# Patient Record
Sex: FEMALE | Race: WHITE | NOT HISPANIC OR LATINO | Employment: FULL TIME | URBAN - METROPOLITAN AREA
[De-identification: names, ages, dates, MRNs, and addresses within clinical notes are randomized per-mention and may not be internally consistent; named-entity substitution may affect disease eponyms.]

---

## 2017-01-04 RX ORDER — TRAMADOL HYDROCHLORIDE AND ACETAMINOPHEN 37.5; 325 MG/1; MG/1
TABLET, FILM COATED ORAL
Qty: 120 TABLET | Refills: 0 | Status: SHIPPED | OUTPATIENT
Start: 2017-01-04 | End: 2017-01-06 | Stop reason: SDUPTHER

## 2017-01-05 RX ORDER — TRAMADOL HYDROCHLORIDE AND ACETAMINOPHEN 37.5; 325 MG/1; MG/1
TABLET, FILM COATED ORAL
Qty: 120 TABLET | Refills: 0 | OUTPATIENT
Start: 2017-01-05

## 2017-01-06 RX ORDER — TRAMADOL HYDROCHLORIDE AND ACETAMINOPHEN 37.5; 325 MG/1; MG/1
TABLET, FILM COATED ORAL
Qty: 120 TABLET | Refills: 0 | Status: SHIPPED | OUTPATIENT
Start: 2017-01-06 | End: 2017-02-08 | Stop reason: SDUPTHER

## 2017-02-08 RX ORDER — TRAMADOL HYDROCHLORIDE AND ACETAMINOPHEN 37.5; 325 MG/1; MG/1
TABLET, FILM COATED ORAL
Qty: 120 TABLET | Refills: 0 | Status: SHIPPED | OUTPATIENT
Start: 2017-02-08 | End: 2017-03-14 | Stop reason: SDUPTHER

## 2017-02-08 NOTE — TELEPHONE ENCOUNTER
----- Message from Adenike Mojica sent at 2/8/2017  3:38 PM CST -----  Contact: [phone]/ self   Patient would like refill of tramadol sent to [facility] on [address]. Please advise.

## 2017-02-09 RX ORDER — TRAMADOL HYDROCHLORIDE AND ACETAMINOPHEN 37.5; 325 MG/1; MG/1
TABLET, FILM COATED ORAL
Qty: 120 TABLET | Refills: 0 | OUTPATIENT
Start: 2017-02-09

## 2017-03-03 ENCOUNTER — TELEPHONE (OUTPATIENT)
Dept: FAMILY MEDICINE | Facility: CLINIC | Age: 42
End: 2017-03-03

## 2017-03-03 DIAGNOSIS — R05.9 COUGH: Primary | ICD-10-CM

## 2017-03-03 RX ORDER — CODEINE PHOSPHATE AND GUAIFENESIN 10; 100 MG/5ML; MG/5ML
10 SOLUTION ORAL EVERY 6 HOURS PRN
Qty: 180 ML | Refills: 0 | Status: SHIPPED | OUTPATIENT
Start: 2017-03-03 | End: 2017-03-13

## 2017-03-03 NOTE — TELEPHONE ENCOUNTER
Patient and  have a cold, congestion, cough, and runny nose both afebrile. They would like cough medicine called in. They are unable to come see a physician as they are preparing for a  tomorrow. [facility] on [address], or

## 2017-03-03 NOTE — TELEPHONE ENCOUNTER
----- Message from Carlie Nagel sent at 3/3/2017 12:39 PM CST -----  No. [phone]    Patient returned your call.

## 2017-03-03 NOTE — TELEPHONE ENCOUNTER
----- Message from Carlie Nagel sent at 3/3/2017 11:34 AM CST -----  No. [phone]   Patient has a cold, congestion, cough, and runny nose.  Can you call in a Zpac to [facility] on [address], or can she come in today for an injection.   Please call.   She has a family  to attend tomorrow.

## 2017-03-14 RX ORDER — TRAMADOL HYDROCHLORIDE AND ACETAMINOPHEN 37.5; 325 MG/1; MG/1
TABLET, FILM COATED ORAL
Qty: 120 TABLET | Refills: 0 | Status: SHIPPED | OUTPATIENT
Start: 2017-03-14 | End: 2017-04-12 | Stop reason: SDUPTHER

## 2017-04-12 RX ORDER — TRAMADOL HYDROCHLORIDE AND ACETAMINOPHEN 37.5; 325 MG/1; MG/1
TABLET, FILM COATED ORAL
Qty: 120 TABLET | Refills: 0 | Status: SHIPPED | OUTPATIENT
Start: 2017-04-12 | End: 2017-05-23 | Stop reason: SDUPTHER

## 2017-05-23 RX ORDER — TRAMADOL HYDROCHLORIDE AND ACETAMINOPHEN 37.5; 325 MG/1; MG/1
TABLET, FILM COATED ORAL
Qty: 120 TABLET | Refills: 0 | Status: SHIPPED | OUTPATIENT
Start: 2017-05-23 | End: 2017-06-22 | Stop reason: SDUPTHER

## 2017-05-25 ENCOUNTER — TELEPHONE (OUTPATIENT)
Dept: FAMILY MEDICINE | Facility: CLINIC | Age: 42
End: 2017-05-25

## 2017-06-23 RX ORDER — TRAMADOL HYDROCHLORIDE AND ACETAMINOPHEN 37.5; 325 MG/1; MG/1
TABLET, FILM COATED ORAL
Qty: 120 TABLET | Refills: 0 | Status: SHIPPED | OUTPATIENT
Start: 2017-06-23 | End: 2017-06-26

## 2017-06-26 ENCOUNTER — OFFICE VISIT (OUTPATIENT)
Dept: FAMILY MEDICINE | Facility: CLINIC | Age: 42
End: 2017-06-26
Payer: COMMERCIAL

## 2017-06-26 VITALS
DIASTOLIC BLOOD PRESSURE: 88 MMHG | HEART RATE: 91 BPM | BODY MASS INDEX: 27 KG/M2 | SYSTOLIC BLOOD PRESSURE: 126 MMHG | OXYGEN SATURATION: 99 % | HEIGHT: 66 IN | WEIGHT: 168 LBS

## 2017-06-26 DIAGNOSIS — M51.9 LUMBAR DISC DISEASE: ICD-10-CM

## 2017-06-26 DIAGNOSIS — M25.552 LEFT HIP PAIN: ICD-10-CM

## 2017-06-26 DIAGNOSIS — M54.42 CHRONIC MIDLINE LOW BACK PAIN WITH LEFT-SIDED SCIATICA: ICD-10-CM

## 2017-06-26 DIAGNOSIS — G89.29 CHRONIC MIDLINE LOW BACK PAIN WITH LEFT-SIDED SCIATICA: ICD-10-CM

## 2017-06-26 DIAGNOSIS — S69.92XA INJURY OF LEFT THUMB, INITIAL ENCOUNTER: ICD-10-CM

## 2017-06-26 DIAGNOSIS — M51.36 ANNULAR TEAR OF LUMBAR DISC: Primary | ICD-10-CM

## 2017-06-26 PROCEDURE — 99214 OFFICE O/P EST MOD 30 MIN: CPT | Mod: S$GLB,,, | Performed by: FAMILY MEDICINE

## 2017-06-26 PROCEDURE — 99999 PR PBB SHADOW E&M-EST. PATIENT-LVL III: CPT | Mod: PBBFAC,,, | Performed by: FAMILY MEDICINE

## 2017-06-26 RX ORDER — TRAMADOL HYDROCHLORIDE 50 MG/1
50 TABLET ORAL EVERY 12 HOURS PRN
Qty: 60 TABLET | Refills: 0 | Status: SHIPPED | OUTPATIENT
Start: 2017-06-26 | End: 2017-07-28 | Stop reason: SDUPTHER

## 2017-06-26 NOTE — PROGRESS NOTES
Subjective:       Patient ID: Kimber Moran is a 41 y.o. female.    Chief Complaint: Dry Skin (Left Eyelid); Hip Pain (Left hip); and Back Pain    41 yr old pleasant white female with chronic low back pain, lumbar disc disease, presents today for her 3 month follow up and medication refills. She had knife cut on her left thumb and also dry skin on the left eyelid.    Chronic LBP - onset many years ago - tried spine epidural injections and seen chiropractor and nothing helped her - she started taking ultracet and it helped - she also tried norco for severe pain - she was never offered physical therapy - she denies any surgery in the back and no car accidents - her MRI from 2013 showed mild degenerative change with no significant central canal or neural foraminal stenosis. L4-5 annular fissure. Of note, no neurological symptoms. She also requesting PT.        History as below - reviewed        Medication Refill   This is a chronic problem. The current episode started more than 1 year ago. The problem occurs constantly. The problem has been gradually improving. Associated symptoms include a rash. Pertinent negatives include no arthralgias, chest pain, congestion, diaphoresis, headaches, myalgias, nausea or sore throat. Nothing aggravates the symptoms. Treatments tried: ultracet. The treatment provided significant relief.   Back Pain   This is a chronic problem. The current episode started more than 1 year ago. The problem occurs constantly. The problem is unchanged. The pain is present in the lumbar spine. The quality of the pain is described as aching. The pain does not radiate. The pain is at a severity of 6/10. The pain is moderate. The symptoms are aggravated by bending, sitting and twisting. Pertinent negatives include no chest pain, dysuria, headaches, paresthesias, pelvic pain or tingling. She has tried analgesics for the symptoms. The treatment provided significant relief.     Review of Systems   Constitutional:  Negative.  Negative for activity change, diaphoresis and unexpected weight change.   HENT: Negative.  Negative for congestion, ear discharge, hearing loss, rhinorrhea, sore throat and voice change.    Eyes: Negative.  Negative for pain, discharge and visual disturbance.   Respiratory: Negative.  Negative for chest tightness, shortness of breath and wheezing.    Cardiovascular: Negative.  Negative for chest pain.   Gastrointestinal: Negative.  Negative for abdominal distention, anal bleeding, constipation and nausea.   Endocrine: Negative.  Negative for cold intolerance, polydipsia and polyuria.   Genitourinary: Negative.  Negative for decreased urine volume, difficulty urinating, dysuria, frequency, menstrual problem, pelvic pain and vaginal pain.   Musculoskeletal: Positive for back pain. Negative for arthralgias, gait problem and myalgias.   Skin: Positive for rash and wound. Negative for color change and pallor.   Allergic/Immunologic: Negative.  Negative for environmental allergies and immunocompromised state.   Neurological: Negative.  Negative for dizziness, tingling, tremors, seizures, speech difficulty, headaches and paresthesias.   Hematological: Negative.  Negative for adenopathy. Does not bruise/bleed easily.   Psychiatric/Behavioral: Negative.  Negative for agitation, confusion, decreased concentration, hallucinations, self-injury and suicidal ideas. The patient is not nervous/anxious.        PMH/PSH/FH/SH/MED/ALLERGY reviewed        Objective:       Vitals:    06/26/17 1341   BP: 126/88   Pulse: 91       Physical Exam   Constitutional: She is oriented to person, place, and time. She appears well-developed and well-nourished. No distress.   HENT:   Head: Normocephalic and atraumatic.   Right Ear: External ear normal.   Left Ear: External ear normal.   Nose: Nose normal.   Mouth/Throat: Oropharynx is clear and moist.   Eyes: Conjunctivae and EOM are normal. Pupils are equal, round, and reactive to light.    Neck: Normal range of motion. Neck supple.   Cardiovascular: Normal rate, regular rhythm, normal heart sounds and intact distal pulses.  Exam reveals no gallop and no friction rub.    No murmur heard.  Pulmonary/Chest: Effort normal and breath sounds normal. No respiratory distress. She has no wheezes. She has no rales. She exhibits no tenderness.   Abdominal: Soft. Bowel sounds are normal.   Musculoskeletal: Normal range of motion. She exhibits tenderness (TTP lumbar and paralumbar area. SLRT neg B/L. Gait normal.). She exhibits no edema.   Neurological: She is alert and oriented to person, place, and time. She has normal reflexes. She displays normal reflexes. No cranial nerve deficit. She exhibits normal muscle tone. Coordination normal.   Skin: Skin is warm and dry. Rash (dry skin scaly rash on the right eyelid) noted. She is not diaphoretic.   A small cut on the left thumb. No sign of infection.   Psychiatric: She has a normal mood and affect.       Assessment:       1. Annular tear of lumbar disc    2. Lumbar disc disease    3. Chronic midline low back pain with left-sided sciatica    4. Left hip pain    5. Injury of left thumb, initial encounter        Plan:       Kimber was seen today for dry skin, hip pain and back pain.    Diagnoses and all orders for this visit:    Annular tear of lumbar disc  -     tramadol (ULTRAM) 50 mg tablet; Take 1 tablet (50 mg total) by mouth every 12 (twelve) hours as needed for Pain.  -     Ambulatory Referral to Physical/Occupational Therapy    Lumbar disc disease  -     tramadol (ULTRAM) 50 mg tablet; Take 1 tablet (50 mg total) by mouth every 12 (twelve) hours as needed for Pain.  -     Ambulatory Referral to Physical/Occupational Therapy    Chronic midline low back pain with left-sided sciatica  -     tramadol (ULTRAM) 50 mg tablet; Take 1 tablet (50 mg total) by mouth every 12 (twelve) hours as needed for Pain.  -     Ambulatory Referral to Physical/Occupational  Therapy    Left hip pain  -     Ambulatory Referral to Physical/Occupational Therapy    Injury of left thumb, initial encounter      Chronic low back pain  -advised physical therapy  -home exercises  -limit use of tramadol - due to addictive potential    Injury left thumb  -bactroban and bandage    Spent adequate time in obtaining history and explaining differentials    40 minutes spent during this visit of which greater than 50% devoted to face-face counseling and coordination of care regarding diagnosis and management plan    Return in about 4 weeks (around 7/24/2017), or if symptoms worsen or fail to improve.

## 2017-07-07 ENCOUNTER — CLINICAL SUPPORT (OUTPATIENT)
Dept: REHABILITATION | Facility: HOSPITAL | Age: 42
End: 2017-07-07
Attending: FAMILY MEDICINE
Payer: COMMERCIAL

## 2017-07-07 DIAGNOSIS — M53.3 SI (SACROILIAC) JOINT DYSFUNCTION: ICD-10-CM

## 2017-07-07 DIAGNOSIS — M54.40 CHRONIC BILATERAL LOW BACK PAIN WITH SCIATICA, SCIATICA LATERALITY UNSPECIFIED: ICD-10-CM

## 2017-07-07 DIAGNOSIS — G89.29 CHRONIC BILATERAL LOW BACK PAIN WITH SCIATICA, SCIATICA LATERALITY UNSPECIFIED: ICD-10-CM

## 2017-07-07 PROBLEM — M54.50 CHRONIC BILATERAL LOW BACK PAIN: Status: ACTIVE | Noted: 2017-07-07

## 2017-07-07 PROCEDURE — 97110 THERAPEUTIC EXERCISES: CPT | Mod: PO

## 2017-07-07 PROCEDURE — 97161 PT EVAL LOW COMPLEX 20 MIN: CPT | Mod: PO

## 2017-07-07 NOTE — PROGRESS NOTES
OUTPATIENT PHYSICAL THERAPY   PATIENT EVALUATION        Name: Kimber Moran  Clinic Number: [id number]        Diagnosis:   Encounter Diagnoses   Name Primary?    SI (sacroiliac) joint dysfunction     Chronic bilateral low back pain with sciatica, sciatica laterality unspecified      Physician: Julio Cesar Perez MD  Treatment Orders: PT Eval and Treat    Past Medical History:   Diagnosis Date    Anxiety     Chronic low back pain     Depression      Current Outpatient Prescriptions   Medication Sig    tramadol (ULTRAM) 50 mg tablet Take 1 tablet (50 mg total) by mouth every 12 (twelve) hours as needed for Pain.     No current facility-administered medications for this visit.      Review of patient's allergies indicates:   Allergen Reactions    Sulfa (sulfonamide antibiotics) Hives and Itching         Precautions: standard    Evaluation Date: 7/7/17  Visit # authorized: 1  Authorization period: 6/26/18  Plan of care Expiration: 9/1/17      Subjective     Onset Date: Chronic for about 10 years  Prior Level of Function: mod I with ADLs and IADLs  Current level of Function: limited due to pain  Social History: Pt is an , and she states she works 14 hour shifts at the [employer].      History of Present Illness: Kimber is a 41 y.o. female that presents to [facility] secondary to chronic LBP. Kimber states she had previous physical therapy with dry needling, and she states that it helped with her pain.  She reports her pain is mostly in her lower back and L hip rarely into her leg, Denies numbness and tingling. Previous tx with chiropracter which pt also reports was helping. She joined a boxing club this week, and she reports she was very sore afterwards. She states her back handled it okay. She states she performs her HEP consistently.    Imaging: MRI taken and revealed Mild degenerative change with no significant central canal or neural foraminal stenosis.  L4-5 annular fissure.    Pain:  current 2/10, worst 9/10, best 2/10, Aching, intermittent  Aggravating factors: standing, household chores, sitting on the floor, long car rides, twisting, leaning back to wash the back of her body  Easing factors: pain medication (tramadol), stretching    Pts goals: to decrease pain and get back into a good exercise program      No cultural, environmental, or spiritual barriers identified to treatment or learning.    Objective     Observation: L sacral rotation present with L anterior ASIS rotation in standing and supine      Lumbar Range of Motion:    Degrees Pain   Flexion 100%   Pull in L side of back        Extension 75%   No pain        Left Side Bending 25% Pain in L side        Right Side Bending 75% Slight stretching on L side        Left rotation   75% pain        Right Rotation   100% No pain             Lower Extremity Strength  Right LE  Left LE    Knee extension: 5/5 Knee extension: 5/5   Knee flexion: 4+/5 Knee flexion: 4+/5   Hip flexion: 5/5 Hip flexion: 4+/5   Hip extension:  4-/5 Hip extension: 4-/5   Hip abduction: 4-/5 Hip abduction: 4-/5   Hip adduction: 4-/5 Hip adduction 4-/5   Ankle dorsiflexion: 5/5 Ankle dorsiflexion: 4+/5   Ankle plantarflexion: NT Ankle plantarflexion: NT     Hip ROM: normal PROM noted    DTR:   Right Left Comment   Patellar (L3-4) 2+ 2+    Achilles (S1) 2+ 2+      Dermatomes:In tact to LT in all dermatomes patterns    Neuro Dynamic Testing:    Sciatic nerve:      SLR: negative SLRs B        Joint Mobility: Hypomobile L3 and L5 with CPA pressure    Palpation: tenderness in L lumbar paraspinal and L PSIS and gluteals      Flexibility:    Ely's test: R = WNL degrees ; L = WNL degrees   Popliteal Angle: R = WNL degrees ; L = WNL degrees       PT Evaluation Completed? Yes  Discussed Plan of Care with patient: Yes    TREATMENT:  Patient provided written and verbal consent to receive functional dry needling at today's visit (see consent form scanned into chart). FDN performed  to L piriformis and L gluteus medius (2 spots) with estim. FDN performed to reduce pain and muscle tension, promote blood flow, and improve ROM and function x 8 minutes. Pt tolerated tx well without adverse effects. She was educated on what to expect following the procedure and she verbalized understanding.        HEP provided: Reviewed ab bracing, piriformis stretching, and SKTC which pt was issued at previous therapy episode and which therapist would like pt to continue.  Instructed pt. Regarding: Proper technique with all exercises. Pt demo good understanding of the education provided. Kimber demonstrated good return demonstration of activities.      Assessment     Kimber is a 41 y.o. female referred to outpatient physical therapy with a diagnosis of chronic LBP. Demonstrates impairments including: limitations as described in the problem list. Pt prognosis is Good. Positive prognostic factors include previous successful episodes of therapy, age, activity level. Negative prognostic factors include chronicity of pain. Pt will benefit from skilled outpatient physical therapy to address the above stated deficits, provide pt/family education, and to maximize pt's level of independence.     Medical necessity is demonstrated by the following IMPAIRMENTS/PROBLEMS:  weakness, impaired endurance, impaired functional mobility, decreased lower extremity function, pain, decreased ROM, impaired joint extensibility and impaired muscle length    History  Co-morbidities and personal factors that may impact the plan of care Examination  Body Structures and Functions, activity limitations and participation restrictions that may impact the plan of care Clinical Presentation   Decision Making/ Complexity Score   Co-morbidities:   anxiety        Personal Factors:   no deficits Body Regions:   back  lower extremities    Body Systems:   gross symmetry  ROM  strength  transfers  transitions  motor control  motor learning    Activity  limitations:   Learning and applying knowledge  no deficits    General Tasks and Commands  no deficits    Mobility  lifting and carrying objects  walking  prolonged positioning    Self care  no deficits    Domestic Life  shopping  cooking  doing house work (cleaning house, washing dishes, laundry)    Life Areas  no deficits    Community and Social Life  community life    Participation Restrictions:   Limited with prolonged positioning required at work         stable and uncomplicated            mod high low low         Rehab Potiential: good    Anticipated Barriers for physical therapy: none    GOALS:    Short Term Goals:  4 weeks    - Pt to increase lumbar L sidebending ROM by at least 25% degrees in order to show improved mobility.  - Pt to be independent and consistent with issued HEP in order to promote carryover between therapy sessions.  - Pt will be able to perform and hold an ab brace for 10 reps of 10 second hold with normal breathing and no cuing in order to demo improved core recruitment.    Long Term Goals: 8 weeks    - Pt will report a decrease in LBP to 2/10  to increase tolerance for prolonged positioning required for work tasks in order to improve activity tolerance.  - Pt will increase hip strength by 1 ms grade in order to increase tolerance to ADLs and functional activities.  - Pt to be Independent with updated HEP in order to maintain gains following discharge from PT.  - Pt will present with good SI alignment without therapist assist for 3 consecutive session to show improved lumbo pelvic stability.        Plan     Recommended Treatment Plan: Pt will be treated by physical therapy 1-3 times a week for 8 weeks for pt education, HEP, therapeutic exercises, neuromuscular re-education, manual therapy, modalities prn to achieve established goals.  Kimber may at times be seen by a PTA as part of the Rehab Team.     Other Recommendations: none      Therapist: Ashley Holstein, PT    I CERTIFY THE NEED  FOR THESE SERVICES FURNISHED UNDER THIS PLAN OF TREATMENT AND WHILE UNDER MY CARE    Physician's comments: ________________________________________________________________________________________________________________________________________________      Physician's Name: ___________________________________

## 2017-07-07 NOTE — PLAN OF CARE
OUTPATIENT PHYSICAL THERAPY   PATIENT EVALUATION        Name: Kimber Moran  Clinic Number: [id number]        Diagnosis:   Encounter Diagnoses   Name Primary?    SI (sacroiliac) joint dysfunction     Chronic bilateral low back pain with sciatica, sciatica laterality unspecified      Physician: Julio Cesar Perez MD  Treatment Orders: PT Eval and Treat    Past Medical History:   Diagnosis Date    Anxiety     Chronic low back pain     Depression      Current Outpatient Prescriptions   Medication Sig    tramadol (ULTRAM) 50 mg tablet Take 1 tablet (50 mg total) by mouth every 12 (twelve) hours as needed for Pain.     No current facility-administered medications for this visit.      Review of patient's allergies indicates:   Allergen Reactions    Sulfa (sulfonamide antibiotics) Hives and Itching         Precautions: standard    Evaluation Date: 7/7/17  Visit # authorized: 1  Authorization period: 6/26/18  Plan of care Expiration: 9/1/17      Subjective     Onset Date: Chronic for about 10 years  Prior Level of Function: mod I with ADLs and IADLs  Current level of Function: limited due to pain  Social History: Pt is an , and she states she works 14 hour shifts at the [employer].      History of Present Illness: Kimber is a 41 y.o. female that presents to [facility] secondary to chronic LBP. Kimber states she had previous physical therapy with dry needling, and she states that it helped with her pain.  She reports her pain is mostly in her lower back and L hip rarely into her leg, Denies numbness and tingling. Previous tx with chiropracter which pt also reports was helping. She joined a boxing club this week, and she reports she was very sore afterwards. She states her back handled it okay. She states she performs her HEP consistently.    Imaging: MRI taken and revealed Mild degenerative change with no significant central canal or neural foraminal stenosis.  L4-5 annular fissure.    Pain:  current 2/10, worst 9/10, best 2/10, Aching, intermittent  Aggravating factors: standing, household chores, sitting on the floor, long car rides, twisting, leaning back to wash the back of her body  Easing factors: pain medication (tramadol), stretching    Pts goals: to decrease pain and get back into a good exercise program      No cultural, environmental, or spiritual barriers identified to treatment or learning.    Objective     Observation: L sacral rotation present with L anterior ASIS rotation in standing and supine      Lumbar Range of Motion:    Degrees Pain   Flexion 100%   Pull in L side of back        Extension 75%   No pain        Left Side Bending 25% Pain in L side        Right Side Bending 75% Slight stretching on L side        Left rotation   75% pain        Right Rotation   100% No pain             Lower Extremity Strength  Right LE  Left LE    Knee extension: 5/5 Knee extension: 5/5   Knee flexion: 4+/5 Knee flexion: 4+/5   Hip flexion: 5/5 Hip flexion: 4+/5   Hip extension:  4-/5 Hip extension: 4-/5   Hip abduction: 4-/5 Hip abduction: 4-/5   Hip adduction: 4-/5 Hip adduction 4-/5   Ankle dorsiflexion: 5/5 Ankle dorsiflexion: 4+/5   Ankle plantarflexion: NT Ankle plantarflexion: NT     Hip ROM: normal PROM noted    DTR:   Right Left Comment   Patellar (L3-4) 2+ 2+    Achilles (S1) 2+ 2+      Dermatomes:In tact to LT in all dermatomes patterns    Neuro Dynamic Testing:    Sciatic nerve:      SLR: negative SLRs B        Joint Mobility: Hypomobile L3 and L5 with CPA pressure    Palpation: tenderness in L lumbar paraspinal and L PSIS and gluteals      Flexibility:    Ely's test: R = WNL degrees ; L = WNL degrees   Popliteal Angle: R = WNL degrees ; L = WNL degrees       PT Evaluation Completed? Yes  Discussed Plan of Care with patient: Yes    TREATMENT:  Patient provided written and verbal consent to receive functional dry needling at today's visit (see consent form scanned into chart). FDN performed  to L piriformis and L gluteus medius (2 spots) with estim. FDN performed to reduce pain and muscle tension, promote blood flow, and improve ROM and function x 8 minutes. Pt tolerated tx well without adverse effects. She was educated on what to expect following the procedure and she verbalized understanding.        HEP provided: Reviewed ab bracing, piriformis stretching, and SKTC which pt was issued at previous therapy episode and which therapist would like pt to continue.  Instructed pt. Regarding: Proper technique with all exercises. Pt demo good understanding of the education provided. Kimber demonstrated good return demonstration of activities.      Assessment     Kimber is a 41 y.o. female referred to outpatient physical therapy with a diagnosis of chronic LBP. Demonstrates impairments including: limitations as described in the problem list. Pt prognosis is Good. Positive prognostic factors include previous successful episodes of therapy, age, activity level. Negative prognostic factors include chronicity of pain. Pt will benefit from skilled outpatient physical therapy to address the above stated deficits, provide pt/family education, and to maximize pt's level of independence.     Medical necessity is demonstrated by the following IMPAIRMENTS/PROBLEMS:  weakness, impaired endurance, impaired functional mobility, decreased lower extremity function, pain, decreased ROM, impaired joint extensibility and impaired muscle length    History  Co-morbidities and personal factors that may impact the plan of care Examination  Body Structures and Functions, activity limitations and participation restrictions that may impact the plan of care Clinical Presentation   Decision Making/ Complexity Score   Co-morbidities:   anxiety        Personal Factors:   no deficits Body Regions:   back  lower extremities    Body Systems:   gross symmetry  ROM  strength  transfers  transitions  motor control  motor learning    Activity  limitations:   Learning and applying knowledge  no deficits    General Tasks and Commands  no deficits    Mobility  lifting and carrying objects  walking  prolonged positioning    Self care  no deficits    Domestic Life  shopping  cooking  doing house work (cleaning house, washing dishes, laundry)    Life Areas  no deficits    Community and Social Life  community life    Participation Restrictions:   Limited with prolonged positioning required at work         stable and uncomplicated            mod high low low         Rehab Potiential: good    Anticipated Barriers for physical therapy: none    GOALS:    Short Term Goals:  4 weeks    - Pt to increase lumbar L sidebending ROM by at least 25% degrees in order to show improved mobility.  - Pt to be independent and consistent with issued HEP in order to promote carryover between therapy sessions.  - Pt will be able to perform and hold an ab brace for 10 reps of 10 second hold with normal breathing and no cuing in order to demo improved core recruitment.    Long Term Goals: 8 weeks    - Pt will report a decrease in LBP to 2/10  to increase tolerance for prolonged positioning required for work tasks in order to improve activity tolerance.  - Pt will increase hip strength by 1 ms grade in order to increase tolerance to ADLs and functional activities.  - Pt to be Independent with updated HEP in order to maintain gains following discharge from PT.  - Pt will present with good SI alignment without therapist assist for 3 consecutive session to show improved lumbo pelvic stability.        Plan     Recommended Treatment Plan: Pt will be treated by physical therapy 1-3 times a week for 8 weeks for pt education, HEP, therapeutic exercises, neuromuscular re-education, manual therapy, modalities prn to achieve established goals.  Kimber may at times be seen by a PTA as part of the Rehab Team.     Other Recommendations: none      Therapist: Ashley Holstein, PT    I CERTIFY THE NEED  FOR THESE SERVICES FURNISHED UNDER THIS PLAN OF TREATMENT AND WHILE UNDER MY CARE    Physician's comments: ________________________________________________________________________________________________________________________________________________      Physician's Name: ___________________________________

## 2017-07-14 ENCOUNTER — CLINICAL SUPPORT (OUTPATIENT)
Dept: REHABILITATION | Facility: HOSPITAL | Age: 42
End: 2017-07-14
Attending: FAMILY MEDICINE
Payer: COMMERCIAL

## 2017-07-14 DIAGNOSIS — G89.29 CHRONIC BILATERAL LOW BACK PAIN WITH SCIATICA, SCIATICA LATERALITY UNSPECIFIED: ICD-10-CM

## 2017-07-14 DIAGNOSIS — M54.40 CHRONIC BILATERAL LOW BACK PAIN WITH SCIATICA, SCIATICA LATERALITY UNSPECIFIED: ICD-10-CM

## 2017-07-14 PROCEDURE — 97140 MANUAL THERAPY 1/> REGIONS: CPT | Mod: PO

## 2017-07-14 PROCEDURE — 97110 THERAPEUTIC EXERCISES: CPT | Mod: PO

## 2017-07-14 NOTE — PROGRESS NOTES
Physical Therapy Progress Note     Name: Kimber Moarn  Clinic Number: [id number]  Diagnosis:   Encounter Diagnosis   Name Primary?    Chronic bilateral low back pain with sciatica, sciatica laterality unspecified      Physician: Julio Cesar Perez MD  Treatment Orders: PT Eval and Treat  Past Medical History:   Diagnosis Date    Anxiety     Chronic low back pain     Depression        Precautions: standard    Evaluation Date: 7/7/17  Visit # authorized: 1  Authorization period: 6/26/18  Plan of care Expiration: 9/1/17      Time In: 07:04 am  Time Out: 0756 am  Total Treatment Time: 50 minutes      Missed visits:  no show(),  Cancellations()    Subjective     Pt reports: Pt reports she felt pain after her boxing class on Wednesday, but she has been fine on all the other days.  Pain Scale:  2/10 pain in L buttock prior to session today    Objective     Observation: L sacral rotation present with L anterior ASIS rotation in standing and supine    Kimber received therapeutic exercises to develop strength, endurance, ROM and core stabilization for 25 minutes including:     TA brace + march 20x  Clams YTB 2 x 10  TA pulldowns with DKTC OTB and green theraball 2 x 10  Pelvic tilt + mini bridge 2 x 10  Cat/cow 10 x 10s    Kimber received the following manual therapy techniques: Joint mobilizations and Soft tissue Mobilization were applied to the: L/S and L gluteals for 25 minutes.   MET to correct L anterior ilial rotation and L sacral sidebending  STM L gluteals  Sacral scrubbing  Patient provided written and verbal consent to receive functional dry needling at today's visit (see consent form scanned into chart). FDN performed to L piriformis and L gluteus medius (2 spots) and L L3/4 multifidus with estim. FDN performed to reduce pain and muscle tension, promote blood flow, and improve ROM and function.  Pt tolerated tx well without adverse effects. She was educated on what  to expect following the procedure and she verbalized understanding.      Written Home Exercises Provided: Continue with current HEP.  Pt demo good understanding of the education provided. Kimber Moran demonstrated good return demonstration of activities.     Assessment       Good tolerance to initial tx session today with mod verbal and tactile cueing for proper core recruitment. Good twitch response in L3 erector spinae with needling to the low back. Will assess response to initial tx session and adjust accordingly. Pt will continue to benefit from skilled outpatient physical therapy to address the deficits listed below in the problem list, provide pt/family education and to maximize pt's level of independence in the home and community environment.     GOALS:    Short Term Goals:  4 weeks    - Pt to increase lumbar L sidebending ROM by at least 25% degrees in order to show improved mobility.  - Pt to be independent and consistent with issued HEP in order to promote carryover between therapy sessions.  - Pt will be able to perform and hold an ab brace for 10 reps of 10 second hold with normal breathing and no cuing in order to demo improved core recruitment.    Long Term Goals: 8 weeks    - Pt will report a decrease in LBP to 2/10  to increase tolerance for prolonged positioning required for work tasks in order to improve activity tolerance.  - Pt will increase hip strength by 1 ms grade in order to increase tolerance to ADLs and functional activities.  - Pt to be Independent with updated HEP in order to maintain gains following discharge from PT.  - Pt will present with good SI alignment without therapist assist for 3 consecutive session to show improved lumbo pelvic stability.         Plan   Continue with established Plan of Care towards PT goals.

## 2017-07-21 ENCOUNTER — CLINICAL SUPPORT (OUTPATIENT)
Dept: REHABILITATION | Facility: HOSPITAL | Age: 42
End: 2017-07-21
Attending: FAMILY MEDICINE
Payer: COMMERCIAL

## 2017-07-21 DIAGNOSIS — M54.40 CHRONIC BILATERAL LOW BACK PAIN WITH SCIATICA, SCIATICA LATERALITY UNSPECIFIED: ICD-10-CM

## 2017-07-21 DIAGNOSIS — G89.29 CHRONIC BILATERAL LOW BACK PAIN WITH SCIATICA, SCIATICA LATERALITY UNSPECIFIED: ICD-10-CM

## 2017-07-21 PROCEDURE — 97140 MANUAL THERAPY 1/> REGIONS: CPT | Mod: PO

## 2017-07-21 PROCEDURE — 97110 THERAPEUTIC EXERCISES: CPT | Mod: PO

## 2017-07-21 NOTE — PROGRESS NOTES
Physical Therapy Progress Note     Name: iKmber Moran  Clinic Number: [id number]  Diagnosis:   Encounter Diagnosis   Name Primary?    Chronic bilateral low back pain with sciatica, sciatica laterality unspecified      Physician: Julio Cesar Perez MD  Treatment Orders: PT Eval and Treat  Past Medical History:   Diagnosis Date    Anxiety     Chronic low back pain     Depression        Precautions: standard    Evaluation Date: 7/7/17  Visit # authorized: 3    Authorization period: 6/26/18  Plan of care Expiration: 9/1/17      Time In: 07:00 am  Time Out: 0755 am  Total Treatment Time: 55 minutes      Missed visits:  no show(),  Cancellations()    Subjective     Pt reports: Pt reports she has been feeling better overall.  Pain Scale:  1/10 pain in L buttock prior to session today    Objective     Observation: Pelvis in alignment     Kimber received therapeutic exercises to develop strength, endurance, ROM and core stabilization for 30 minutes including:     TA brace + march 20x  Clams YTB 2 x 10 + reverse clamshells   TA pulldowns with DKTC OTB and green theraball 2 x 10  Pelvic tilt + mini bridge 2 x 10 with OTB   Cat/cow 10 x 10s  Swiss ball alt arm/leg x 10 B  1/2 kneel cable cross 7# x 10 B       Kimber received the following manual therapy techniques: Joint mobilizations and Soft tissue Mobilization were applied to the: L/S and L gluteals for 25 minutes.   MF cupping to B lumbar paraspinals bilat (R more tender than R)  STM L gluteals  Sacral scrubbing    Patient provided written and verbal consent to receive functional dry needling at today's visit (see consent form scanned into chart). FDN performed to L piriformis and L gluteus medius (2 spots) and L L3/4 multifidus with estim. FDN performed to reduce pain and muscle tension, promote blood flow, and improve ROM and function.  Pt tolerated tx well without adverse effects. She was educated on what to expect  following the procedure and she verbalized understanding. (NOT TODAY)      Written Home Exercises Provided: Continue with current HEP.  Pt demo good understanding of the education provided. Kimber Moran demonstrated good return demonstration of activities.     Assessment       Tolerated advancements in activities above without adverse effects. Slight difficulty with elevating her R LE during quadruped activity due to weakness in R sided musculatures. Pt in proper alignment prior to therapy today and did not require therapist adjustment. Responded well to cupping in her paraspinals with improved tissue mobility noted following tx. Pt will continue to benefit from skilled outpatient physical therapy to address the deficits listed below in the problem list, provide pt/family education and to maximize pt's level of independence in the home and community environment.     GOALS:    Short Term Goals:  4 weeks    - Pt to increase lumbar L sidebending ROM by at least 25% degrees in order to show improved mobility.  - Pt to be independent and consistent with issued HEP in order to promote carryover between therapy sessions.  - Pt will be able to perform and hold an ab brace for 10 reps of 10 second hold with normal breathing and no cuing in order to demo improved core recruitment.    Long Term Goals: 8 weeks    - Pt will report a decrease in LBP to 2/10  to increase tolerance for prolonged positioning required for work tasks in order to improve activity tolerance.  - Pt will increase hip strength by 1 ms grade in order to increase tolerance to ADLs and functional activities.  - Pt to be Independent with updated HEP in order to maintain gains following discharge from PT.  - Pt will present with good SI alignment without therapist assist for 3 consecutive session to show improved lumbo pelvic stability.         Plan   Continue with established Plan of Care towards PT goals.

## 2017-07-28 DIAGNOSIS — M51.36 ANNULAR TEAR OF LUMBAR DISC: ICD-10-CM

## 2017-07-28 DIAGNOSIS — G89.29 CHRONIC MIDLINE LOW BACK PAIN WITH LEFT-SIDED SCIATICA: ICD-10-CM

## 2017-07-28 DIAGNOSIS — M54.42 CHRONIC MIDLINE LOW BACK PAIN WITH LEFT-SIDED SCIATICA: ICD-10-CM

## 2017-07-28 DIAGNOSIS — M51.9 LUMBAR DISC DISEASE: ICD-10-CM

## 2017-07-28 RX ORDER — TRAMADOL HYDROCHLORIDE 50 MG/1
TABLET ORAL
Qty: 60 TABLET | Refills: 0 | Status: SHIPPED | OUTPATIENT
Start: 2017-07-28 | End: 2017-08-30 | Stop reason: SDUPTHER

## 2017-08-11 ENCOUNTER — CLINICAL SUPPORT (OUTPATIENT)
Dept: REHABILITATION | Facility: HOSPITAL | Age: 42
End: 2017-08-11
Attending: FAMILY MEDICINE
Payer: COMMERCIAL

## 2017-08-11 DIAGNOSIS — G89.29 CHRONIC BILATERAL LOW BACK PAIN WITH SCIATICA, SCIATICA LATERALITY UNSPECIFIED: ICD-10-CM

## 2017-08-11 DIAGNOSIS — M54.40 CHRONIC BILATERAL LOW BACK PAIN WITH SCIATICA, SCIATICA LATERALITY UNSPECIFIED: ICD-10-CM

## 2017-08-11 PROCEDURE — 97110 THERAPEUTIC EXERCISES: CPT | Mod: PO

## 2017-08-11 PROCEDURE — 97140 MANUAL THERAPY 1/> REGIONS: CPT | Mod: PO

## 2017-08-11 NOTE — PROGRESS NOTES
Physical Therapy Progress Note     Name: Kimber Moran  Clinic Number: [id number]  Diagnosis:   Encounter Diagnosis   Name Primary?    Chronic bilateral low back pain with sciatica, sciatica laterality unspecified      Physician: Julio Cesar Perez MD  Treatment Orders: PT Eval and Treat  Past Medical History:   Diagnosis Date    Anxiety     Chronic low back pain     Depression        Precautions: standard    Evaluation Date: 7/7/17  Visit # authorized: 4    Authorization period: 6/26/18  Plan of care Expiration: 9/1/17      Time In: 07:05 am  Time Out: 08:00 am  Total Treatment Time: 55 minutes      Missed visits:  no show(),  Cancellations()    Subjective     Pt reports: Pt reports she had increased soreness following last session. She had a rough day on Wednesday following increased activity and time on her feet.  Pain Scale:  1/10 pain in L buttock prior to session today    Objective     Observation:     Kimber received therapeutic exercises to develop strength, endurance, ROM and core stabilization for 30 minutes including:     TA brace + advanced march 20x  Clams YTB 2 x 10 + reverse clamshells   TA pulldowns with DKTC OTB and green theraball 2 x 10  Pelvic tilt + mini bridge 2 x 10 with OTB   Cat/cow 10 x 10s  Swiss ball alt arm/leg x 10 B  1/2 kneel cable cross 7# x 10 B - out of time      Kimber received the following manual therapy techniques: Joint mobilizations and Soft tissue Mobilization were applied to the: L/S and L gluteals for 25 minutes.   MF cupping to B lumbar paraspinals bilat (R more tender than R)  STM L gluteals  Sacral scrubbing    Patient provided written and verbal consent to receive functional dry needling at today's visit (see consent form scanned into chart). FDN performed to L piriformis and L gluteus medius (2 spots) and L 5 multifidus with estim. FDN performed to reduce pain and muscle tension, promote blood flow, and improve ROM and  function.  Pt tolerated tx well without adverse effects. She was educated on what to expect following the procedure and she verbalized understanding.       Written Home Exercises Provided: Continue with current HEP.  Pt demo good understanding of the education provided. Kimber Moran demonstrated good return demonstration of activities.     Assessment       Good tolerance to tx session today with ability to perform all therex without increased pain or discomfort. Improved pelvic alignment noted following manual correction and techniques. Pt responding well to dry needling to the lumbar spine and piriformis mm; therefore tx continued.. Pt will continue to benefit from skilled outpatient physical therapy to address the deficits listed below in the problem list, provide pt/family education and to maximize pt's level of independence in the home and community environment.     GOALS:    Short Term Goals:  4 weeks    - Pt to increase lumbar L sidebending ROM by at least 25% degrees in order to show improved mobility.  - Pt to be independent and consistent with issued HEP in order to promote carryover between therapy sessions.  - Pt will be able to perform and hold an ab brace for 10 reps of 10 second hold with normal breathing and no cuing in order to demo improved core recruitment.    Long Term Goals: 8 weeks    - Pt will report a decrease in LBP to 2/10  to increase tolerance for prolonged positioning required for work tasks in order to improve activity tolerance.  - Pt will increase hip strength by 1 ms grade in order to increase tolerance to ADLs and functional activities.  - Pt to be Independent with updated HEP in order to maintain gains following discharge from PT.  - Pt will present with good SI alignment without therapist assist for 3 consecutive session to show improved lumbo pelvic stability.         Plan   Continue with established Plan of Care towards PT goals.

## 2017-08-18 ENCOUNTER — CLINICAL SUPPORT (OUTPATIENT)
Dept: REHABILITATION | Facility: HOSPITAL | Age: 42
End: 2017-08-18
Attending: FAMILY MEDICINE
Payer: COMMERCIAL

## 2017-08-18 DIAGNOSIS — G89.29 CHRONIC BILATERAL LOW BACK PAIN WITH SCIATICA, SCIATICA LATERALITY UNSPECIFIED: ICD-10-CM

## 2017-08-18 DIAGNOSIS — M54.40 CHRONIC BILATERAL LOW BACK PAIN WITH SCIATICA, SCIATICA LATERALITY UNSPECIFIED: ICD-10-CM

## 2017-08-18 PROCEDURE — 97140 MANUAL THERAPY 1/> REGIONS: CPT | Mod: PO

## 2017-08-18 PROCEDURE — 97110 THERAPEUTIC EXERCISES: CPT | Mod: PO

## 2017-08-18 NOTE — PROGRESS NOTES
Physical Therapy Progress Note     Name: Kimber Moran  Clinic Number: [id number]  Diagnosis:   Encounter Diagnosis   Name Primary?    Chronic bilateral low back pain with sciatica, sciatica laterality unspecified      Physician: Julio Cesar Perez MD  Treatment Orders: PT Eval and Treat  Past Medical History:   Diagnosis Date    Anxiety     Chronic low back pain     Depression        Precautions: standard    Evaluation Date: 7/7/17  Visit # authorized: 5    Authorization period: 6/26/18  Plan of care Expiration: 9/1/17      Time In: 07:00 am  Time Out: 08:00 am  Total Treatment Time: 60 minutes      Missed visits:  no show(),  Cancellations()    Subjective     Pt reports: Pt reports she is having good days and bad days. Wednesday was a bad day after participating in boxing.  Pain Scale:  0/10 pain in L buttock prior to session today    Objective     Observation:     Kimber received therapeutic exercises to develop strength, endurance, ROM and core stabilization for 35 minutes including:     TA brace + advanced march 20x  Clams OTB 2 x 10 + reverse clamshells   TA pulldowns with bridging OTB and green theraball 2 x 10  Pelvic tilt + mini bridge 2 x 10 with OTB   Cat/cow 10 x 10s  Swiss ball alt arm/leg x 10 B  Side plank 3 x 15s  1/2 kneel cable cross 7# x 10 B   Palloff press cable machine 2 x 10  Swiss ball rollout x 10      Kimber received the following manual therapy techniques: Joint mobilizations and Soft tissue Mobilization were applied to the: L/S and L gluteals for 25 minutes.   MF cupping to B lumbar paraspinals bilat (R more tender than R)  STM L gluteals  Sacral scrubbing  Correction for L ASIS anterior rotation    Patient provided written and verbal consent to receive functional dry needling at today's visit (see consent form scanned into chart). FDN performed to L piriformis and L gluteus medius (2 spots) and L 5 multifidus with estim. FDN performed to  reduce pain and muscle tension, promote blood flow, and improve ROM and function.  Pt tolerated tx well without adverse effects. She was educated on what to expect following the procedure and she verbalized understanding. NOT TODAY      Written Home Exercises Provided: Continue with current HEP.  Pt demo good understanding of the education provided. Kimber Moran demonstrated good return demonstration of activities.     Assessment       Tolerated advancements in activities above without adverse effects. Improved SI alignment following manual therapy at this time. Requires cueing for proper core stabilization with activities. Pt will continue to benefit from skilled outpatient physical therapy to address the deficits listed below in the problem list, provide pt/family education and to maximize pt's level of independence in the home and community environment.     GOALS:    Short Term Goals:  4 weeks    - Pt to increase lumbar L sidebending ROM by at least 25% degrees in order to show improved mobility.  - Pt to be independent and consistent with issued HEP in order to promote carryover between therapy sessions.  - Pt will be able to perform and hold an ab brace for 10 reps of 10 second hold with normal breathing and no cuing in order to demo improved core recruitment.    Long Term Goals: 8 weeks    - Pt will report a decrease in LBP to 2/10  to increase tolerance for prolonged positioning required for work tasks in order to improve activity tolerance.  - Pt will increase hip strength by 1 ms grade in order to increase tolerance to ADLs and functional activities.  - Pt to be Independent with updated HEP in order to maintain gains following discharge from PT.  - Pt will present with good SI alignment without therapist assist for 3 consecutive session to show improved lumbo pelvic stability.         Plan   Continue with established Plan of Care towards PT goals.

## 2017-08-30 DIAGNOSIS — M54.42 CHRONIC MIDLINE LOW BACK PAIN WITH LEFT-SIDED SCIATICA: ICD-10-CM

## 2017-08-30 DIAGNOSIS — M51.9 LUMBAR DISC DISEASE: ICD-10-CM

## 2017-08-30 DIAGNOSIS — G89.29 CHRONIC MIDLINE LOW BACK PAIN WITH LEFT-SIDED SCIATICA: ICD-10-CM

## 2017-08-30 DIAGNOSIS — M51.36 ANNULAR TEAR OF LUMBAR DISC: ICD-10-CM

## 2017-08-30 RX ORDER — TRAMADOL HYDROCHLORIDE 50 MG/1
TABLET ORAL
Qty: 60 TABLET | Refills: 0 | Status: SHIPPED | OUTPATIENT
Start: 2017-08-30 | End: 2017-09-27 | Stop reason: SDUPTHER

## 2017-09-01 ENCOUNTER — CLINICAL SUPPORT (OUTPATIENT)
Dept: REHABILITATION | Facility: HOSPITAL | Age: 42
End: 2017-09-01
Attending: FAMILY MEDICINE
Payer: COMMERCIAL

## 2017-09-01 DIAGNOSIS — M54.40 CHRONIC BILATERAL LOW BACK PAIN WITH SCIATICA, SCIATICA LATERALITY UNSPECIFIED: ICD-10-CM

## 2017-09-01 DIAGNOSIS — G89.29 CHRONIC BILATERAL LOW BACK PAIN WITH SCIATICA, SCIATICA LATERALITY UNSPECIFIED: ICD-10-CM

## 2017-09-01 PROCEDURE — 97140 MANUAL THERAPY 1/> REGIONS: CPT | Mod: PO

## 2017-09-01 PROCEDURE — 97110 THERAPEUTIC EXERCISES: CPT | Mod: PO

## 2017-09-01 NOTE — PROGRESS NOTES
"                                                    Physical Therapy Progress Note     Name: Kimber Moran  Clinic Number: [id number]  Diagnosis:   Encounter Diagnosis   Name Primary?    Chronic bilateral low back pain with sciatica, sciatica laterality unspecified      Physician: Julio Cesar Perez MD  Treatment Orders: PT Eval and Treat  Past Medical History:   Diagnosis Date    Anxiety     Chronic low back pain     Depression        Precautions: standard    Evaluation Date: 7/7/17  Visit # authorized: 6 (PN next visit)  Authorization period: 6/26/18  Plan of care Expiration: 9/1/17       Time In: 07:00 am  Time Out: 08:00 am  Total Treatment Time: 60 minutes      Missed visits:  no show(),  Cancellations()    Subjective     Pt reports: Pt reports she has not been feeling so great. Increased pain and "shocking" pain especially when wearing heels at work. She states she has been boxing lately as well without too much increase in pain just a general hurting overall.  Pain Scale:  3/10 pain in L buttock prior to session today    Objective     Observation:     Kimber received therapeutic exercises to develop strength, endurance, ROM and core stabilization for 45 minutes including:     TA brace + advanced march 20x  Clams OTB 2 x 10 + reverse clamshells   TA pulldowns with bridging OTB and green theraball 2 x 10  Pelvic tilt + mini bridge 2 x 10 with OTB   Cat/cow 10 x 10s  Swiss ball alt arm/leg x 10 B  Side plank 3 x 15s  1/2 kneel cable cross 7# x 10 B   Palloff press cable machine 2 x 10  Swiss ball rollout x 10      Kimber received the following manual therapy techniques: Joint mobilizations and Soft tissue Mobilization were applied to the: L/S and L gluteals for 15 minutes.   MF cupping to B lumbar paraspinals bilat (R more tender than R)  STM L gluteals  Sacral scrubbing  Correction for L ASIS anterior rotation  Patient provided written and verbal consent to receive functional dry needling at today's visit (see " consent form scanned into chart). FDN performed to L5 multifidus B and  L piriformis (2 spots) and L 5 multifidus with estim. FDN performed to reduce pain and muscle tension, promote blood flow, and improve ROM and function.  Pt tolerated tx well without adverse effects. She was educated on what to expect following the procedure and she verbalized understanding.       Written Home Exercises Provided: Continue with current HEP.  Pt demo good understanding of the education provided. Kimber Moran demonstrated good return demonstration of activities.     Assessment       Pt remains challenged with activities advanced at last session; therefore, no advancements made today. Improved SI alignment noted following manual correction. Significant twitch response noted in L piriformis muscle with FDN today. Pt encouraged to remain consistent with her HEP in order to promote good core stabilization and carryover between sessions since she only attends therapy 1x per week.   Pt will continue to benefit from skilled outpatient physical therapy to address the deficits listed below in the problem list, provide pt/family education and to maximize pt's level of independence in the home and community environment.     GOALS:    Short Term Goals:  4 weeks    - Pt to increase lumbar L sidebending ROM by at least 25% degrees in order to show improved mobility.  - Pt to be independent and consistent with issued HEP in order to promote carryover between therapy sessions.  - Pt will be able to perform and hold an ab brace for 10 reps of 10 second hold with normal breathing and no cuing in order to demo improved core recruitment.    Long Term Goals: 8 weeks    - Pt will report a decrease in LBP to 2/10  to increase tolerance for prolonged positioning required for work tasks in order to improve activity tolerance.  - Pt will increase hip strength by 1 ms grade in order to increase tolerance to ADLs and functional activities.  - Pt to be  Independent with updated HEP in order to maintain gains following discharge from PT.  - Pt will present with good SI alignment without therapist assist for 3 consecutive session to show improved lumbo pelvic stability.         Plan   Continue with established Plan of Care towards PT goals.

## 2017-09-08 ENCOUNTER — CLINICAL SUPPORT (OUTPATIENT)
Dept: REHABILITATION | Facility: HOSPITAL | Age: 42
End: 2017-09-08
Attending: FAMILY MEDICINE
Payer: COMMERCIAL

## 2017-09-08 DIAGNOSIS — M54.40 CHRONIC BILATERAL LOW BACK PAIN WITH SCIATICA, SCIATICA LATERALITY UNSPECIFIED: ICD-10-CM

## 2017-09-08 DIAGNOSIS — G89.29 CHRONIC BILATERAL LOW BACK PAIN WITH SCIATICA, SCIATICA LATERALITY UNSPECIFIED: ICD-10-CM

## 2017-09-08 PROCEDURE — 97110 THERAPEUTIC EXERCISES: CPT | Mod: PO

## 2017-09-08 PROCEDURE — 97140 MANUAL THERAPY 1/> REGIONS: CPT | Mod: PO

## 2017-09-08 NOTE — PLAN OF CARE
Physical Therapy Progress Note     Name: Kimber Moran  Clinic Number: [id number]  Diagnosis:   Encounter Diagnosis   Name Primary?    Chronic bilateral low back pain with sciatica, sciatica laterality unspecified      Physician: Julio Cesar Perez MD  Treatment Orders: PT Eval and Treat  Past Medical History:   Diagnosis Date    Anxiety     Chronic low back pain     Depression        Precautions: standard    Evaluation Date: 7/7/17  Visit # authorized: 7 / 20  Authorization period: 6/26/18  Plan of care Expiration: 11/3/17      Time In: 07:00 am  Time Out: 08:00 am  Total Treatment Time: 60 minutes      Missed visits:  no show(),  Cancellations()    Subjective     Pt reports: Pt reports she had great week in terms of low back pain. She was able to box without pain.  Pain Scale:  2/10 pain in L buttock prior to session today    Objective     Observation:   Lumbar Range of Motion:    Degrees Pain   Flexion 100%   Pull in L side of back        Extension 75%   tightness        Left Side Bending 50% Pain in L side        Right Side Bending 100% No pain      Left rotation   100% No pain        Right Rotation   100% No pain               Kimber received therapeutic exercises to develop strength, endurance, ROM and core stabilization for 45 minutes including:     TA brace + advanced march 20x  Clams OTB 2 x 10 + reverse clamshells   TA pulldowns with bridging OTB and green theraball 2 x 10  Pelvic tilt with single leg kick out 2 x 10 with OTB   Cat/cow 10 x 10s  Swiss ball alt arm/leg x 10 B  Side plank 3 x 15s  1/2 kneel cable cross 7# x 10 B   Palloff press cable machine 2 x 10  Swiss ball rollout x 10      Kimber received the following manual therapy techniques: Joint mobilizations and Soft tissue Mobilization were applied to the: L/S and L gluteals for 15 minutes.   MF cupping to B lumbar paraspinals bilat (R more tender than R)  STM L gluteals  Sacral  scrubbing  Correction for L ASIS anterior rotation  Patient provided written and verbal consent to receive functional dry needling at today's visit (see consent form scanned into chart). FDN performed to L5 multifidus B and  L piriformis (2 spots) and L 5 multifidus with estim. FDN performed to reduce pain and muscle tension, promote blood flow, and improve ROM and function.  Pt tolerated tx well without adverse effects. She was educated on what to expect following the procedure and she verbalized understanding.       Written Home Exercises Provided: Continue with current HEP.  Pt demo good understanding of the education provided. Kimber Moran demonstrated good return demonstration of activities.     Assessment       Pt making good progress towards therapy goals at this time meeting all STGs. She is reporting overall improvements in functional mobility and decreased pain. She is participating in boxing at this time without signs or symptoms. She reports occasional pinching in her lower back with activities requiring lumbar extension.  Pt will continue to benefit from skilled outpatient physical therapy to address lumbopelvic stability including the deficits listed below in the problem list, provide pt/family education and to maximize pt's level of independence in the home and community environment.     GOALS:    Short Term Goals:  4 weeks    - Pt to increase lumbar L sidebending ROM by at least 25% degrees in order to show improved mobility. Met 9/8/17  - Pt to be independent and consistent with issued HEP in order to promote carryover between therapy sessions. Met 9/8/17  - Pt will be able to perform and hold an ab brace for 10 reps of 10 second hold with normal breathing and no cuing in order to demo improved core recruitment. Met 9/8/17    Long Term Goals: 8 weeks    - Pt will report a decrease in LBP to 2/10  to increase tolerance for prolonged positioning required for work tasks in order to improve activity  tolerance.  - Pt will increase hip strength by 1 ms grade in order to increase tolerance to ADLs and functional activities.  - Pt to be Independent with updated HEP in order to maintain gains following discharge from PT.  - Pt will present with good SI alignment without therapist assist for 3 consecutive session to show improved lumbo pelvic stability.         Plan   Continue with established Plan of Care towards PT goals for an addition 8 weeks 1x per week.

## 2017-09-08 NOTE — PROGRESS NOTES
Physical Therapy Progress Note     Name: Kimber Moran  Clinic Number: [id number]  Diagnosis:   Encounter Diagnosis   Name Primary?    Chronic bilateral low back pain with sciatica, sciatica laterality unspecified      Physician: Julio Cesar Perez MD  Treatment Orders: PT Eval and Treat  Past Medical History:   Diagnosis Date    Anxiety     Chronic low back pain     Depression        Precautions: standard    Evaluation Date: 7/7/17  Visit # authorized: 7 / 20  Authorization period: 6/26/18  Plan of care Expiration: 11/3/17      Time In: 07:00 am  Time Out: 08:00 am  Total Treatment Time: 60 minutes      Missed visits:  no show(),  Cancellations()    Subjective     Pt reports: Pt reports she had great week in terms of low back pain. She was able to box without pain.  Pain Scale:  2/10 pain in L buttock prior to session today    Objective     Observation:   Lumbar Range of Motion:    Degrees Pain   Flexion 100%   Pull in L side of back        Extension 75%   tightness        Left Side Bending 50% Pain in L side        Right Side Bending 100% No pain      Left rotation   100% No pain        Right Rotation   100% No pain               Kimber received therapeutic exercises to develop strength, endurance, ROM and core stabilization for 45 minutes including:     TA brace + advanced march 20x  Clams OTB 2 x 10 + reverse clamshells   TA pulldowns with bridging OTB and green theraball 2 x 10  Pelvic tilt with single leg kick out 2 x 10 with OTB   Cat/cow 10 x 10s  Swiss ball alt arm/leg x 10 B  Side plank 3 x 15s  1/2 kneel cable cross 7# x 10 B   Palloff press cable machine 2 x 10  Swiss ball rollout x 10      Kimber received the following manual therapy techniques: Joint mobilizations and Soft tissue Mobilization were applied to the: L/S and L gluteals for 15 minutes.   MF cupping to B lumbar paraspinals bilat (R more tender than R)  STM L gluteals  Sacral  scrubbing  Correction for L ASIS anterior rotation  Patient provided written and verbal consent to receive functional dry needling at today's visit (see consent form scanned into chart). FDN performed to L5 multifidus B and  L piriformis (2 spots) and L 5 multifidus with estim. FDN performed to reduce pain and muscle tension, promote blood flow, and improve ROM and function.  Pt tolerated tx well without adverse effects. She was educated on what to expect following the procedure and she verbalized understanding.       Written Home Exercises Provided: Continue with current HEP.  Pt demo good understanding of the education provided. Kimber Moran demonstrated good return demonstration of activities.     Assessment       Pt making good progress towards therapy goals at this time meeting all STGs. She is reporting overall improvements in functional mobility and decreased pain. She is participating in boxing at this time without signs or symptoms. She reports occasional pinching in her lower back with activities requiring lumbar extension.  Pt will continue to benefit from skilled outpatient physical therapy to address lumbopelvic stability including the deficits listed below in the problem list, provide pt/family education and to maximize pt's level of independence in the home and community environment.     GOALS:    Short Term Goals:  4 weeks    - Pt to increase lumbar L sidebending ROM by at least 25% degrees in order to show improved mobility. Met 9/8/17  - Pt to be independent and consistent with issued HEP in order to promote carryover between therapy sessions. Met 9/8/17  - Pt will be able to perform and hold an ab brace for 10 reps of 10 second hold with normal breathing and no cuing in order to demo improved core recruitment. Met 9/8/17    Long Term Goals: 8 weeks    - Pt will report a decrease in LBP to 2/10  to increase tolerance for prolonged positioning required for work tasks in order to improve activity  tolerance.  - Pt will increase hip strength by 1 ms grade in order to increase tolerance to ADLs and functional activities.  - Pt to be Independent with updated HEP in order to maintain gains following discharge from PT.  - Pt will present with good SI alignment without therapist assist for 3 consecutive session to show improved lumbo pelvic stability.         Plan   Continue with established Plan of Care towards PT goals for an addition 8 weeks 1x per week.

## 2017-09-26 ENCOUNTER — OFFICE VISIT (OUTPATIENT)
Dept: URGENT CARE | Facility: CLINIC | Age: 42
End: 2017-09-26
Payer: COMMERCIAL

## 2017-09-26 VITALS
HEIGHT: 66 IN | SYSTOLIC BLOOD PRESSURE: 120 MMHG | OXYGEN SATURATION: 99 % | RESPIRATION RATE: 16 BRPM | DIASTOLIC BLOOD PRESSURE: 82 MMHG | BODY MASS INDEX: 26.52 KG/M2 | TEMPERATURE: 98 F | WEIGHT: 165 LBS | HEART RATE: 80 BPM

## 2017-09-26 DIAGNOSIS — R30.0 DYSURIA: Primary | ICD-10-CM

## 2017-09-26 DIAGNOSIS — N39.0 URINARY TRACT INFECTION WITH HEMATURIA, SITE UNSPECIFIED: ICD-10-CM

## 2017-09-26 DIAGNOSIS — R31.9 URINARY TRACT INFECTION WITH HEMATURIA, SITE UNSPECIFIED: ICD-10-CM

## 2017-09-26 LAB
B-HCG UR QL: NEGATIVE
BILIRUB UR QL STRIP: NEGATIVE
CTP QC/QA: YES
GLUCOSE UR QL STRIP: NEGATIVE
KETONES UR QL STRIP: NEGATIVE
LEUKOCYTE ESTERASE UR QL STRIP: POSITIVE
PH, POC UA: 6.5 (ref 5–8)
POC BLOOD, URINE: POSITIVE
POC NITRATES, URINE: POSITIVE
PROT UR QL STRIP: POSITIVE
SP GR UR STRIP: 1.01 (ref 1–1.03)
UROBILINOGEN UR STRIP-ACNC: NORMAL (ref 0.1–1.1)

## 2017-09-26 PROCEDURE — 81003 URINALYSIS AUTO W/O SCOPE: CPT | Mod: QW,S$GLB,, | Performed by: FAMILY MEDICINE

## 2017-09-26 PROCEDURE — 3008F BODY MASS INDEX DOCD: CPT | Mod: S$GLB,,, | Performed by: FAMILY MEDICINE

## 2017-09-26 PROCEDURE — 81025 URINE PREGNANCY TEST: CPT | Mod: QW,S$GLB,, | Performed by: FAMILY MEDICINE

## 2017-09-26 PROCEDURE — 99214 OFFICE O/P EST MOD 30 MIN: CPT | Mod: 25,S$GLB,, | Performed by: FAMILY MEDICINE

## 2017-09-26 RX ORDER — NITROFURANTOIN 25; 75 MG/1; MG/1
100 CAPSULE ORAL 2 TIMES DAILY
Qty: 14 CAPSULE | Refills: 0 | Status: SHIPPED | OUTPATIENT
Start: 2017-09-26 | End: 2017-10-03

## 2017-09-26 RX ORDER — PHENAZOPYRIDINE HYDROCHLORIDE 200 MG/1
200 TABLET, FILM COATED ORAL 3 TIMES DAILY PRN
Qty: 6 TABLET | Refills: 0 | Status: SHIPPED | OUTPATIENT
Start: 2017-09-26 | End: 2017-09-28

## 2017-09-26 NOTE — PROGRESS NOTES
"Subjective:       Patient ID: Kimber Moran is a 41 y.o. female.    Vitals:  height is 5' 6" (1.676 m) and weight is 74.8 kg (165 lb). Her temperature is 98.4 °F (36.9 °C). Her blood pressure is 120/82 and her pulse is 80. Her respiration is 16 and oxygen saturation is 99%.     Chief Complaint: Dysuria (BLADDER PRESSURE) and Urinary Urgency    Dysuria    This is a new problem. The current episode started yesterday. The problem occurs every urination. The problem has been unchanged. Quality: PRESSURE. The pain is at a severity of 5/10. The pain is mild. There has been no fever. She is sexually active. Associated symptoms include frequency and urgency. Pertinent negatives include no chills, hematuria, nausea or vomiting. She has tried nothing for the symptoms.     Review of Systems   Constitution: Negative for chills and fever.   Musculoskeletal: Negative for back pain.   Gastrointestinal: Negative for abdominal pain, nausea and vomiting.   Genitourinary: Positive for dysuria, frequency and urgency. Negative for genital sores, hematuria, missed menses and non-menstrual bleeding.       Objective:      Physical Exam   Constitutional: She is oriented to person, place, and time. She appears well-developed and well-nourished.   HENT:   Head: Normocephalic and atraumatic.   Right Ear: External ear normal.   Left Ear: External ear normal.   Nose: Nose normal. No nasal deformity. No epistaxis.   Mouth/Throat: Oropharynx is clear and moist and mucous membranes are normal.   Eyes: Conjunctivae and lids are normal.   Neck: Trachea normal, normal range of motion and phonation normal. Neck supple.   Cardiovascular: Normal rate, regular rhythm, normal heart sounds and normal pulses.    Pulmonary/Chest: Effort normal and breath sounds normal.   Abdominal: Soft. Normal appearance and bowel sounds are normal. She exhibits no distension and no mass. There is tenderness in the suprapubic area. There is no rigidity, no guarding and no CVA " tenderness.   Neurological: She is alert and oriented to person, place, and time.   Skin: Skin is warm, dry and intact.   Psychiatric: She has a normal mood and affect. Her speech is normal and behavior is normal. Cognition and memory are normal.   Nursing note and vitals reviewed.      Assessment:       1. Dysuria    2. Urinary tract infection with hematuria, site unspecified        Plan:         Dysuria  -     POCT Urinalysis, Dipstick, Automated, W/O Scope  -     POCT urine pregnancy    Urinary tract infection with hematuria, site unspecified  -     nitrofurantoin, macrocrystal-monohydrate, (MACROBID) 100 MG capsule; Take 1 capsule (100 mg total) by mouth 2 (two) times daily.  Dispense: 14 capsule; Refill: 0  -     phenazopyridine (PYRIDIUM) 200 MG tablet; Take 1 tablet (200 mg total) by mouth 3 (three) times daily as needed for Pain.  Dispense: 6 tablet; Refill: 0      Kimber was seen today for dysuria and urinary urgency.    Diagnoses and all orders for this visit:    Dysuria  -     POCT Urinalysis, Dipstick, Automated, W/O Scope  -     POCT urine pregnancy    Urinary tract infection with hematuria, site unspecified  -     nitrofurantoin, macrocrystal-monohydrate, (MACROBID) 100 MG capsule; Take 1 capsule (100 mg total) by mouth 2 (two) times daily.  -     phenazopyridine (PYRIDIUM) 200 MG tablet; Take 1 tablet (200 mg total) by mouth 3 (three) times daily as needed for Pain.            Follow Up Comments   Make sure that you follow up with your primary care doctor in the next 2-5 days if needed .  Return to the Urgent Care if signs or symptoms change and certainly if you have worsening symptoms go to the nearest emergency department for further evaluation.     Erma Limon MD

## 2017-09-26 NOTE — PATIENT INSTRUCTIONS
"  Bladder Infection, Female (Adult)    Urine is normally doesn't have any bacteria in it. But bacteria can get into the urinary tract from the skin around the rectum. Or they can travel in the blood from elsewhere in the body. Once they are in your urinary tract, they can cause infection in the urethra (urethritis), the bladder (cystitis), or the kidneys (pyelonephritis).  The most common place for an infection is in the bladder. This is called a bladder infection. This is one of the most common infections in women. Most bladder infections are easily treated. They are not serious unless the infection spreads to the kidney.  The phrases "bladder infection," "UTI," and "cystitis" are often used to describe the same thing. But they are not always the same. Cystitis is an inflammation of the bladder. The most common cause of cystitis is an infection.  Symptoms  The infection causes inflammation in the urethra and bladder. This causes many of the symptoms. The most common symptoms of a bladder infection are:  · Pain or burning when urinating  · Having to urinate more often than usual  · Urgent need to urinate  · Only a small amount of urine comes out  · Blood in urine  · Abdominal discomfort. This is usually in the lower abdomen above the pubic bone.  · Cloudy urine  · Strong- or bad-smelling urine  · Unable to urinate (urinary retention)  · Unable to hold urine in (urinary incontinence)  · Fever  · Loss of appetite  · Confusion (in older adults)  Causes  Bladder infections are not contagious. You can't get one from someone else, from a toilet seat, or from sharing a bath.  The most common cause of bladder infections is bacteria from the bowels. The bacteria get onto the skin around the opening of the urethra. From there, they can get into the urine and travel up to the bladder, causing inflammation and infection. This usually happens because of:  · Wiping improperly after urinating. Always wipe from front to " back.  · Bowel incontinence  · Pregnancy  · Procedures such as having a catheter inserted  · Older age  · Not emptying your bladder. This can allow bacteria a chance to grow in your urine.  · Dehydration  · Constipation  · Sex  · Use of a diaphragm for birth control   Treatment  Bladder infections are diagnosed by a urine test. They are treated with antibiotics and usually clear up quickly without complications. Treatment helps prevent a more serious kidney infection.  Medicines  Medicines can help in the treatment of a bladder infection:  · Take antibiotics until they are used up, even if you feel better. It is important to finish them to make sure the infection has cleared.  · You can use acetaminophen or ibuprofen for pain, fever, or discomfort, unless another medicine was prescribed. If you have chronic liver or kidney disease, talk with your healthcare provider before using these medicines. Also talk with your provider if you've ever had a stomach ulcer or gastrointestinal bleeding, or are taking blood-thinner medicines.  · If you are given phenazopydridine to reduce burning with urination, it will cause your urine to become a bright orange color. This can stain clothing.  Care and prevention  These self-care steps can help prevent future infections:  · Drink plenty of fluids to prevent dehydration and flush out your bladder. Do this unless you must restrict fluids for other health reasons, or your doctor told you not to.  · Proper cleaning after going to the bathroom is important. Wipe from front to back after using the toilet to prevent the spread of bacteria.  · Urinate more often. Don't try to hold urine in for a long time.  · Wear loose-fitting clothes and cotton underwear. Avoid tight-fitting pants.  · Improve your diet and prevent constipation. Eat more fresh fruit and vegetables, and fiber, and less junk and fatty foods.  · Avoid sex until your symptoms are gone.  · Avoid caffeine, alcohol, and spicy  foods. These can irritate your bladder.  · Urinate right after intercourse to flush out your bladder.  · If you use birth control pills and have frequent bladder infections, discuss it with your doctor.  Follow-up care  Call your healthcare provider if all symptoms are not gone after 3 days of treatment. This is especially important if you have repeat infections.  If a culture was done, you will be told if your treatment needs to be changed. If directed, you can call to find out the results.  If X-rays were done, you will be told if the results will affect your treatment.  Call 911  Call 911 if any of the following occur:  · Trouble breathing  · Hard to wake up or confusion  · Fainting or loss of consciousness  · Rapid heart rate  When to seek medical advice  Call your healthcare provider right away if any of these occur:  · Fever of 100.4ºF (38.0ºC) or higher, or as directed by your healthcare provider  · Symptoms are not better by the third day of treatment  · Back or belly (abdominal) pain that gets worse  · Repeated vomiting, or unable to keep medicine down  · Weakness or dizziness  · Vaginal discharge  · Pain, redness, or swelling in the outer vaginal area (labia)  Date Last Reviewed: 10/1/2016  © 3032-5423 [employer]. [address] [place] [address], [place] [address]. All rights reserved. This information is not intended as a substitute for professional medical care. Always follow your healthcare professional's instructions.      Kimber was seen today for dysuria and urinary urgency.    Diagnoses and all orders for this visit:    Dysuria  -     POCT Urinalysis, Dipstick, Automated, W/O Scope  -     POCT urine pregnancy    Urinary tract infection with hematuria, site unspecified  -     nitrofurantoin, macrocrystal-monohydrate, (MACROBID) 100 MG capsule; Take 1 capsule (100 mg total) by mouth 2 (two) times daily.  -     phenazopyridine (PYRIDIUM) 200 MG tablet; Take 1 tablet (200 mg total) by mouth 3  (three) times daily as needed for Pain.            Follow Up Comments   Make sure that you follow up with your primary care doctor in the next 2-5 days if needed .  Return to the Urgent Care if signs or symptoms change and certainly if you have worsening symptoms go to the nearest emergency department for further evaluation.     Erma Limon MD

## 2017-09-27 DIAGNOSIS — G89.29 CHRONIC MIDLINE LOW BACK PAIN WITH LEFT-SIDED SCIATICA: ICD-10-CM

## 2017-09-27 DIAGNOSIS — M51.36 ANNULAR TEAR OF LUMBAR DISC: ICD-10-CM

## 2017-09-27 DIAGNOSIS — M54.42 CHRONIC MIDLINE LOW BACK PAIN WITH LEFT-SIDED SCIATICA: ICD-10-CM

## 2017-09-27 DIAGNOSIS — M51.9 LUMBAR DISC DISEASE: ICD-10-CM

## 2017-09-28 RX ORDER — TRAMADOL HYDROCHLORIDE 50 MG/1
TABLET ORAL
Qty: 60 TABLET | Refills: 0 | Status: SHIPPED | OUTPATIENT
Start: 2017-09-28 | End: 2017-11-27 | Stop reason: SDUPTHER

## 2017-09-29 ENCOUNTER — TELEPHONE (OUTPATIENT)
Dept: URGENT CARE | Facility: CLINIC | Age: 42
End: 2017-09-29

## 2017-11-24 ENCOUNTER — DOCUMENTATION ONLY (OUTPATIENT)
Dept: REHABILITATION | Facility: HOSPITAL | Age: 42
End: 2017-11-24

## 2017-11-24 PROBLEM — G89.29 CHRONIC BILATERAL LOW BACK PAIN: Status: RESOLVED | Noted: 2017-07-07 | Resolved: 2017-11-24

## 2017-11-24 PROBLEM — M54.50 CHRONIC BILATERAL LOW BACK PAIN: Status: RESOLVED | Noted: 2017-07-07 | Resolved: 2017-11-24

## 2017-11-24 NOTE — PROGRESS NOTES
PHYSICAL THERAPY DISCHARGE SUMMARY     Name: Kimber Moran  Clinic Number: [id number]    Diagnosis:        Encounter Diagnosis   Name Primary?    Chronic bilateral low back pain with sciatica, sciatica laterality unspecified        Physician: Julio Cesar Perez MD  Treatment Orders: PT Eval and Treat    Past Medical History:   Diagnosis Date    Anxiety     Chronic low back pain     Depression        Initial visit: 7/7/17  Date of Last visit: 9/8/17  Total Visits Received: 7    ASSESSMENT     Pt attended 7 visits of physical therapy . She was inconsistent with therapy attendance, and she has not been seen since last visit. She will be discharged from therapy at this time.    Short term goals status at last reassessment: met    Long term Goals Not achieved and why: did not complete full therapy episode    Discharge reason : Non-Compliance with attendance and Pt has not re-scheduled further follow-up sessions    Short Term Goals:  4 weeks     - Pt to increase lumbar L sidebending ROM by at least 25% degrees in order to show improved mobility. Met 9/8/17  - Pt to be independent and consistent with issued HEP in order to promote carryover between therapy sessions. Met 9/8/17  - Pt will be able to perform and hold an ab brace for 10 reps of 10 second hold with normal breathing and no cuing in order to demo improved core recruitment. Met 9/8/17     Long Term Goals: 8 weeks     - Pt will report a decrease in LBP to 2/10  to increase tolerance for prolonged positioning required for work tasks in order to improve activity tolerance.  - Pt will increase hip strength by 1 ms grade in order to increase tolerance to ADLs and functional activities.  - Pt to be Independent with updated HEP in order to maintain gains following discharge from PT.  - Pt will present with good SI alignment without therapist assist for 3 consecutive session to show improved lumbo pelvic stability.         PLAN   This patient is discharged from Physical  Therapy Services.

## 2017-11-27 DIAGNOSIS — M51.9 LUMBAR DISC DISEASE: ICD-10-CM

## 2017-11-27 DIAGNOSIS — M54.42 CHRONIC MIDLINE LOW BACK PAIN WITH LEFT-SIDED SCIATICA: ICD-10-CM

## 2017-11-27 DIAGNOSIS — M51.36 ANNULAR TEAR OF LUMBAR DISC: ICD-10-CM

## 2017-11-27 DIAGNOSIS — G89.29 CHRONIC MIDLINE LOW BACK PAIN WITH LEFT-SIDED SCIATICA: ICD-10-CM

## 2017-11-27 RX ORDER — TRAMADOL HYDROCHLORIDE 50 MG/1
TABLET ORAL
Qty: 60 TABLET | Refills: 0 | Status: SHIPPED | OUTPATIENT
Start: 2017-11-27 | End: 2017-12-29 | Stop reason: SDUPTHER

## 2017-11-29 DIAGNOSIS — G89.29 CHRONIC MIDLINE LOW BACK PAIN WITH LEFT-SIDED SCIATICA: ICD-10-CM

## 2017-11-29 DIAGNOSIS — M51.36 ANNULAR TEAR OF LUMBAR DISC: ICD-10-CM

## 2017-11-29 DIAGNOSIS — M51.9 LUMBAR DISC DISEASE: ICD-10-CM

## 2017-11-29 DIAGNOSIS — M54.42 CHRONIC MIDLINE LOW BACK PAIN WITH LEFT-SIDED SCIATICA: ICD-10-CM

## 2017-11-29 RX ORDER — TRAMADOL HYDROCHLORIDE 50 MG/1
TABLET ORAL
Qty: 60 TABLET | Refills: 0 | OUTPATIENT
Start: 2017-11-29

## 2017-12-14 ENCOUNTER — OFFICE VISIT (OUTPATIENT)
Dept: URGENT CARE | Facility: CLINIC | Age: 42
End: 2017-12-14
Payer: COMMERCIAL

## 2017-12-14 VITALS
HEIGHT: 66 IN | DIASTOLIC BLOOD PRESSURE: 85 MMHG | RESPIRATION RATE: 18 BRPM | BODY MASS INDEX: 26.52 KG/M2 | WEIGHT: 165 LBS | SYSTOLIC BLOOD PRESSURE: 125 MMHG | HEART RATE: 93 BPM | OXYGEN SATURATION: 97 % | TEMPERATURE: 99 F

## 2017-12-14 DIAGNOSIS — S93.432A SPRAIN OF TIBIOFIBULAR LIGAMENT OF LEFT ANKLE, INITIAL ENCOUNTER: Primary | ICD-10-CM

## 2017-12-14 DIAGNOSIS — J06.9 URTI (ACUTE UPPER RESPIRATORY INFECTION): ICD-10-CM

## 2017-12-14 PROCEDURE — 96372 THER/PROPH/DIAG INJ SC/IM: CPT | Mod: S$GLB,,, | Performed by: FAMILY MEDICINE

## 2017-12-14 PROCEDURE — 99214 OFFICE O/P EST MOD 30 MIN: CPT | Mod: 25,S$GLB,, | Performed by: FAMILY MEDICINE

## 2017-12-14 RX ORDER — BETAMETHASONE SODIUM PHOSPHATE AND BETAMETHASONE ACETATE 3; 3 MG/ML; MG/ML
12 INJECTION, SUSPENSION INTRA-ARTICULAR; INTRALESIONAL; INTRAMUSCULAR; SOFT TISSUE
Status: COMPLETED | OUTPATIENT
Start: 2017-12-14 | End: 2017-12-14

## 2017-12-14 RX ADMIN — BETAMETHASONE SODIUM PHOSPHATE AND BETAMETHASONE ACETATE 12 MG: 3; 3 INJECTION, SUSPENSION INTRA-ARTICULAR; INTRALESIONAL; INTRAMUSCULAR; SOFT TISSUE at 10:12

## 2017-12-14 NOTE — LETTER
December 14, 2017      [facility]  [address] [place] [address]  Phone: [phone]  Fax: [phone]       Patient: Kimber Moran   YOB: 1975  Date of Visit: 12/14/2017    To Whom It May Concern:    Richard Moran  was at [facility] on 12/14/2017. She may return to work/school on Dec. 18, 2017 with no restrictions. If you have any questions or concerns, or if I can be of further assistance, please do not hesitate to contact me.    Sincerely,    Caro Rose MD

## 2017-12-14 NOTE — PROGRESS NOTES
"Subjective:       Patient ID: Kimber Moran is a 41 y.o. female.    Vitals:  height is 5' 6" (1.676 m) and weight is 74.8 kg (165 lb). Her oral temperature is 98.8 °F (37.1 °C). Her blood pressure is 125/85 and her pulse is 93. Her respiration is 18 and oxygen saturation is 97%.     Chief Complaint: Trauma (fall pain to outside of lt ankle); Sinus Problem (x 2 weeks); and Cough (x 2 weeks)    Trauma   The incident occurred 6 to 12 hours ago. The incident occurred in the street. The injury mechanism was a fall. The injury occurred in the context of other. No protective equipment was used. There is an injury to the left ankle (ankle). The pain is moderate. It is unlikely that a foreign body is present. Associated symptoms include coughing. Pertinent negatives include no abdominal pain, chest pain, neck pain, numbness or weakness. There have been no prior injuries to these areas. Her tetanus status is UTD.   Sinus Problem   This is a new problem. The current episode started 1 to 4 weeks ago. There has been no fever. She is experiencing no pain. Associated symptoms include congestion, coughing, sinus pressure and sneezing. Pertinent negatives include no neck pain or shortness of breath. Past treatments include oral decongestants and acetaminophen. The treatment provided mild relief.   Cough   Associated symptoms include nasal congestion and postnasal drip. Pertinent negatives include no chest pain or shortness of breath. Nothing aggravates the symptoms. The treatment provided mild relief. Her past medical history is significant for bronchitis and environmental allergies. There is no history of asthma, COPD, emphysema or pneumonia.     Review of Systems   Constitution: Negative for weakness and malaise/fatigue.   HENT: Positive for congestion, postnasal drip, sinus pressure and sneezing. Negative for nosebleeds.    Cardiovascular: Negative for chest pain and syncope.   Respiratory: Positive for cough. Negative for " shortness of breath.    Musculoskeletal: Positive for joint pain, joint swelling and stiffness. Negative for back pain and neck pain.   Gastrointestinal: Negative for abdominal pain.   Genitourinary: Negative for hematuria.   Neurological: Negative for dizziness and numbness.   Allergic/Immunologic: Positive for environmental allergies.       Objective:      Physical Exam   Constitutional: She is oriented to person, place, and time. Vital signs are normal. She appears well-developed and well-nourished. She is active and cooperative. No distress.   HENT:   Head: Normocephalic and atraumatic.   Nose: Nose normal.   Mouth/Throat: Oropharynx is clear and moist and mucous membranes are normal.   Eyes: Conjunctivae and lids are normal.   Neck: Trachea normal, normal range of motion, full passive range of motion without pain and phonation normal. Neck supple.   Cardiovascular: Normal rate, regular rhythm, normal heart sounds, intact distal pulses and normal pulses.    Pulmonary/Chest: Effort normal and breath sounds normal.   Abdominal: Soft. Normal appearance and bowel sounds are normal. She exhibits no abdominal bruit, no pulsatile midline mass and no mass.   Musculoskeletal: She exhibits no edema or deformity.   Left foot slight limping, swelling and TTP over the left lateral malleolus, No erythema.   Neurological: She is alert and oriented to person, place, and time. She has normal strength and normal reflexes. No sensory deficit.   Skin: Skin is warm, dry and intact. She is not diaphoretic.   Psychiatric: She has a normal mood and affect. Her speech is normal and behavior is normal. Judgment and thought content normal. Cognition and memory are normal.   Nursing note and vitals reviewed.      Assessment:       1. Sprain of tibiofibular ligament of left ankle, initial encounter    2. URTI (acute upper respiratory infection)        Plan:         Sprain of tibiofibular ligament of left ankle, initial encounter  -      X-Ray Ankle Complete Left; Future; Expected date: 12/14/2017    URTI (acute upper respiratory infection)    Other orders  -     betamethasone acetate-betamethasone sodium phosphate injection 12 mg; Inject 2 mLs (12 mg total) into the muscle one time.        Elevate every 2 hours for 10 mins.  Ice 10 mins 3-5 x/ day x 2-3 days.  May ace wrap or not.  Tylenol/ Ibuprofen.  FF up with Orthopedist if not any better or if pain worsens beyond 5-7 days.  Patient/Guardian voiced understanding and agreement.    Rest. Fluids.Tylenol or Ibuprofen.  FF up with PCP/Specialist if not better as discussed.  Patient/ Guardian voiced agreement and understanding.

## 2017-12-16 ENCOUNTER — TELEPHONE (OUTPATIENT)
Dept: URGENT CARE | Facility: CLINIC | Age: 42
End: 2017-12-16

## 2017-12-17 ENCOUNTER — OFFICE VISIT (OUTPATIENT)
Dept: URGENT CARE | Facility: CLINIC | Age: 42
End: 2017-12-17
Payer: COMMERCIAL

## 2017-12-17 VITALS
SYSTOLIC BLOOD PRESSURE: 134 MMHG | BODY MASS INDEX: 26.52 KG/M2 | DIASTOLIC BLOOD PRESSURE: 88 MMHG | RESPIRATION RATE: 18 BRPM | HEART RATE: 84 BPM | OXYGEN SATURATION: 96 % | TEMPERATURE: 99 F | HEIGHT: 66 IN | WEIGHT: 165 LBS

## 2017-12-17 DIAGNOSIS — Z20.828 EXPOSURE TO THE FLU: ICD-10-CM

## 2017-12-17 DIAGNOSIS — J20.9 ACUTE BRONCHITIS WITH BRONCHOSPASM: Primary | ICD-10-CM

## 2017-12-17 LAB
CTP QC/QA: YES
FLUAV AG NPH QL: NEGATIVE
FLUBV AG NPH QL: NEGATIVE

## 2017-12-17 PROCEDURE — 99214 OFFICE O/P EST MOD 30 MIN: CPT | Mod: 25,S$GLB,, | Performed by: NURSE PRACTITIONER

## 2017-12-17 PROCEDURE — 87804 INFLUENZA ASSAY W/OPTIC: CPT | Mod: 59,QW,S$GLB, | Performed by: NURSE PRACTITIONER

## 2017-12-17 RX ORDER — ALBUTEROL SULFATE 90 UG/1
2 AEROSOL, METERED RESPIRATORY (INHALATION) EVERY 4 HOURS PRN
Qty: 1 INHALER | Refills: 0 | Status: SHIPPED | OUTPATIENT
Start: 2017-12-17 | End: 2018-01-30 | Stop reason: SDUPTHER

## 2017-12-17 RX ORDER — BENZONATATE 200 MG/1
200 CAPSULE ORAL 3 TIMES DAILY PRN
Qty: 30 CAPSULE | Refills: 0 | Status: SHIPPED | OUTPATIENT
Start: 2017-12-17 | End: 2017-12-27

## 2017-12-17 RX ORDER — OSELTAMIVIR PHOSPHATE 75 MG/1
75 CAPSULE ORAL 2 TIMES DAILY
Qty: 20 CAPSULE | Refills: 0 | Status: SHIPPED | OUTPATIENT
Start: 2017-12-17 | End: 2017-12-27

## 2017-12-17 RX ORDER — PROMETHAZINE HYDROCHLORIDE AND CODEINE PHOSPHATE 6.25; 1 MG/5ML; MG/5ML
5 SOLUTION ORAL EVERY 4 HOURS PRN
Qty: 120 ML | Refills: 0 | Status: SHIPPED | OUTPATIENT
Start: 2017-12-17 | End: 2017-12-27

## 2017-12-17 NOTE — PROGRESS NOTES
"Subjective:       Patient ID: Kimber Moran is a 41 y.o. female.    Vitals:  height is 5' 6" (1.676 m) and weight is 74.8 kg (165 lb). Her tympanic temperature is 98.6 °F (37 °C). Her blood pressure is 134/88 and her pulse is 84. Her respiration is 18 and oxygen saturation is 96%.     Chief Complaint: Cough    C/o continued non productive cough with intermittent night time wheezing, congestion, pain to her chest when she coughs, and some nasal congestion.  She recently received a steroid shot for this.  Requesting cough medication.  Her grand daughter tested positive for the flu today.      Cough   This is a new problem. The current episode started 1 to 4 weeks ago. The problem has been gradually worsening. The cough is productive of sputum. Associated symptoms include chest pain, nasal congestion, postnasal drip, shortness of breath and wheezing. Pertinent negatives include no chills, ear congestion, ear pain, eye redness, fever, headaches, heartburn, hemoptysis, myalgias, rash, rhinorrhea, sore throat, sweats or weight loss. The symptoms are aggravated by lying down. Treatments tried: Mucinex, Dayquil, nigh quil, and robitussin. The treatment provided no relief. There is no history of asthma.     Review of Systems   Constitution: Negative for chills, fever, malaise/fatigue and weight loss.   HENT: Positive for postnasal drip. Negative for congestion, ear pain, hoarse voice, rhinorrhea and sore throat.    Eyes: Negative for discharge and redness.   Cardiovascular: Positive for chest pain. Negative for dyspnea on exertion and leg swelling.        Patient states her chest is burning from coughing.   Respiratory: Positive for cough, shortness of breath, sputum production and wheezing. Negative for hemoptysis.    Skin: Negative for rash.   Musculoskeletal: Negative for myalgias.   Gastrointestinal: Positive for diarrhea (this has resolved). Negative for abdominal pain, heartburn, nausea and vomiting.   Neurological: " Negative for headaches.       Objective:      Physical Exam   Constitutional: She is oriented to person, place, and time. She appears well-developed and well-nourished. She is cooperative.  Non-toxic appearance. She does not appear ill. No distress.   HENT:   Head: Normocephalic and atraumatic.   Right Ear: Hearing, tympanic membrane, external ear and ear canal normal.   Left Ear: Hearing, tympanic membrane, external ear and ear canal normal.   Nose: Mucosal edema present. No rhinorrhea or nasal deformity. No epistaxis. Right sinus exhibits no maxillary sinus tenderness and no frontal sinus tenderness. Left sinus exhibits no maxillary sinus tenderness and no frontal sinus tenderness.   Mouth/Throat: Uvula is midline, oropharynx is clear and moist and mucous membranes are normal. No trismus in the jaw. Normal dentition. No uvula swelling. No oropharyngeal exudate, posterior oropharyngeal edema or posterior oropharyngeal erythema. Tonsils are 0 on the right. Tonsils are 0 on the left.   Eyes: Conjunctivae and lids are normal. No scleral icterus.   Sclera clear bilat   Neck: Trachea normal, full passive range of motion without pain and phonation normal. Neck supple.   Cardiovascular: Normal rate, regular rhythm, normal heart sounds and normal pulses.    Pulmonary/Chest: Effort normal and breath sounds normal. No respiratory distress. She has no wheezes.   Non productive cough present through out exam.   Abdominal: Soft. Normal appearance and bowel sounds are normal. She exhibits no distension. There is no tenderness.   Musculoskeletal: Normal range of motion. She exhibits no edema or deformity.   Lymphadenopathy:     She has no cervical adenopathy.   Neurological: She is alert and oriented to person, place, and time. She exhibits normal muscle tone. Coordination normal.   Skin: Skin is warm, dry and intact. She is not diaphoretic. No pallor.   Psychiatric: She has a normal mood and affect. Her speech is normal and  "behavior is normal. Judgment and thought content normal. Cognition and memory are normal.   Nursing note and vitals reviewed.      Assessment:       1. Acute bronchitis with bronchospasm    2. Exposure to the flu        Plan:         Acute bronchitis with bronchospasm  -     benzonatate (TESSALON) 200 MG capsule; Take 1 capsule (200 mg total) by mouth 3 (three) times daily as needed for Cough.  Dispense: 30 capsule; Refill: 0  -     promethazine-codeine 6.25-10 mg/5 ml (PHENERGAN WITH CODEINE) 6.25-10 mg/5 mL syrup; Take 5 mLs by mouth every 4 (four) hours as needed for Cough.  Dispense: 120 mL; Refill: 0  -     albuterol 90 mcg/actuation inhaler; Inhale 2 puffs into the lungs every 4 (four) hours as needed for Wheezing. Rescue  Dispense: 1 Inhaler; Refill: 0    Exposure to the flu  -     POCT Influenza A/B  -     oseltamivir (TAMIFLU) 75 MG capsule; Take 1 capsule (75 mg total) by mouth 2 (two) times daily.  Dispense: 20 capsule; Refill: 0      Patient Instructions                  If your condition worsens or fails to improve we recommend that you receive another evaluation at the ER immediately or contact your PCP to discuss your concerns or return here. You must understand that you've received an urgent care treatment only and that you may be released before all your medical problems are known or treated. You the patient will arrange for follouwp care as instructed.   If we discussed that I think your illness is viral, it will not respond to antibiotics and will last 10-14 days.  Flonase (fluticasone) is a nasal spray which is available over the counter and may help with your symptoms.   Zyrtec D, Claritin D or Allegra D can also help with symptoms of congestion and drainage.   If you have hypertension avoid using the "D" which is the decongestant   If you just have drainage you can take plain zyrtec, claritin or allegra   If you just have a congested feeling you can take pseudoephedrine (unless you have high " blood pressure) which you have to sign for behind the counter. Do not buy the phenylephrine which is on the shelf as it is not effective   Rest and fluids are also important.   Tylenol or ibuprofen can also be used as directed for pain unless you have an allergy to them or medical condition such as stomach ulcers, kidney or liver disease or blood thinners etc for which you should not be taking these type of medications.   If you are flying in the next few days Afrin nose drops for the airplane flight upon take off and landing may help. Other than at those times refrain from using afrin.   If you were prescribed a narcotic do not drive or operate heavy machinery while taking these medications.         Bronchitis with Wheezing (Viral or Bacterial: Adult)    Bronchitis is an infection of the air passages. It often occurs during a cold and is usually caused by a virus. Symptoms include cough with mucus (phlegm) and low-grade fever. This illness is contagious during the first few days and is spread through the air by coughing and sneezing, or by direct contact (touching the sick person and then touching your own eyes, nose, or mouth).  If there is a lot of inflammation, air flow is restricted. The air passages may also go into spasm, especially if you have asthma. This causes wheezing and difficulty breathing even in people who do not have asthma.  Bronchitis usually lasts 7 to 14 days. The wheezing should improve with treatment during the first week. An inhaler is often prescribed to relax the air passages and stop wheezing. Antibiotics will be prescribed if your doctor thinks there is also a secondary bacterial infection.  Home care  · If symptoms are severe, rest at home for the first 2 to 3 days. When you go back to your usual activities, don't let yourself get too tired.  · Do not smoke. Also avoid being exposed to secondhand smoke.  · You may use over-the-counter medicine to control fever or pain, unless another  medicine was prescribed. Note: If you have chronic liver or kidney disease or have ever had a stomach ulcer or gastrointestinal bleeding, talk with your healthcare provider before using these medicines. Also talk to your provider if you are taking medicine to prevent blood clots.) Aspirin should never be given to anyone younger than 18 years of age who is ill with a viral infection or fever. It may cause severe liver or brain damage.  · Your appetite may be poor, so a light diet is fine. Avoid dehydration by drinking 6 to 8 glasses of fluids per day (such as water, soft drinks, sports drinks, juices, tea, or soup). Extra fluids will help loosen secretions in the nose and lungs.  · Over-the-counter cough, cold, and sore-throat medicines will not shorten the length of the illness, but they may be helpful to reduce symptoms. (Note: Do not use decongestants if you have high blood pressure.)  · If you were given an inhaler, use it exactly as directed. If you need to use it more often than prescribed, your condition may be worsening. If this happens, contact your healthcare provider.  · If prescribed, finish all antibiotic medicine, even if you are feeling better after only a few days.  Follow-up care  Follow up with your healthcare provider, or as advised. If you had an X-ray or ECG (electrocardiogram), a specialist will review it. You will be notified of any new findings that may affect your care.  Note: If you are age 65 or older, or if you have a chronic lung disease or condition that affects your immune system, or you smoke, talk to your healthcare provider about having a pneumococcal vaccinations and a yearly influenza vaccination (flu shot).  When to seek medical advice  Call your healthcare provider right away if any of these occur:  · Fever of 100.4°F (38°C) or higher  · Coughing up increasing amounts of colored sputum  · Weakness, drowsiness, headache, facial pain, ear pain, or a stiff neck  Call 911, or get  immediate medical care  Contact emergency services right away if any of these occur.  · Coughing up blood  · Worsening weakness, drowsiness, headache, or stiff neck  · Increased wheezing not helped with medication, shortness of breath, or pain with breathing  Date Last Reviewed: 9/13/2015  © 5854-9520 [employer]. [address] [place] [address], [place] [address]. All rights reserved. This information is not intended as a substitute for professional medical care. Always follow your healthcare professional's instructions.

## 2017-12-17 NOTE — LETTER
December 17, 2017      [facility]  [address]  [place] [address]  Phone: [phone]  Fax: [phone]       Patient: Kimber Moran   YOB: 1975  Date of Visit: 12/17/2017    To Whom It May Concern:    Richard Moran  was at [facility] on 12/17/2017. She may return to work/school on 12/20/17 with no restrictions. If you have any questions or concerns, or if I can be of further assistance, please do not hesitate to contact me.    Sincerely,        Nunu Godinez NP

## 2017-12-18 NOTE — PATIENT INSTRUCTIONS
"               If your condition worsens or fails to improve we recommend that you receive another evaluation at the ER immediately or contact your PCP to discuss your concerns or return here. You must understand that you've received an urgent care treatment only and that you may be released before all your medical problems are known or treated. You the patient will arrange for follouwp care as instructed.   If we discussed that I think your illness is viral, it will not respond to antibiotics and will last 10-14 days.  Flonase (fluticasone) is a nasal spray which is available over the counter and may help with your symptoms.   Zyrtec D, Claritin D or Allegra D can also help with symptoms of congestion and drainage.   If you have hypertension avoid using the "D" which is the decongestant   If you just have drainage you can take plain zyrtec, claritin or allegra   If you just have a congested feeling you can take pseudoephedrine (unless you have high blood pressure) which you have to sign for behind the counter. Do not buy the phenylephrine which is on the shelf as it is not effective   Rest and fluids are also important.   Tylenol or ibuprofen can also be used as directed for pain unless you have an allergy to them or medical condition such as stomach ulcers, kidney or liver disease or blood thinners etc for which you should not be taking these type of medications.   If you are flying in the next few days Afrin nose drops for the airplane flight upon take off and landing may help. Other than at those times refrain from using afrin.   If you were prescribed a narcotic do not drive or operate heavy machinery while taking these medications.         Bronchitis with Wheezing (Viral or Bacterial: Adult)    Bronchitis is an infection of the air passages. It often occurs during a cold and is usually caused by a virus. Symptoms include cough with mucus (phlegm) and low-grade fever. This illness is contagious during the first " few days and is spread through the air by coughing and sneezing, or by direct contact (touching the sick person and then touching your own eyes, nose, or mouth).  If there is a lot of inflammation, air flow is restricted. The air passages may also go into spasm, especially if you have asthma. This causes wheezing and difficulty breathing even in people who do not have asthma.  Bronchitis usually lasts 7 to 14 days. The wheezing should improve with treatment during the first week. An inhaler is often prescribed to relax the air passages and stop wheezing. Antibiotics will be prescribed if your doctor thinks there is also a secondary bacterial infection.  Home care  · If symptoms are severe, rest at home for the first 2 to 3 days. When you go back to your usual activities, don't let yourself get too tired.  · Do not smoke. Also avoid being exposed to secondhand smoke.  · You may use over-the-counter medicine to control fever or pain, unless another medicine was prescribed. Note: If you have chronic liver or kidney disease or have ever had a stomach ulcer or gastrointestinal bleeding, talk with your healthcare provider before using these medicines. Also talk to your provider if you are taking medicine to prevent blood clots.) Aspirin should never be given to anyone younger than 18 years of age who is ill with a viral infection or fever. It may cause severe liver or brain damage.  · Your appetite may be poor, so a light diet is fine. Avoid dehydration by drinking 6 to 8 glasses of fluids per day (such as water, soft drinks, sports drinks, juices, tea, or soup). Extra fluids will help loosen secretions in the nose and lungs.  · Over-the-counter cough, cold, and sore-throat medicines will not shorten the length of the illness, but they may be helpful to reduce symptoms. (Note: Do not use decongestants if you have high blood pressure.)  · If you were given an inhaler, use it exactly as directed. If you need to use it more  often than prescribed, your condition may be worsening. If this happens, contact your healthcare provider.  · If prescribed, finish all antibiotic medicine, even if you are feeling better after only a few days.  Follow-up care  Follow up with your healthcare provider, or as advised. If you had an X-ray or ECG (electrocardiogram), a specialist will review it. You will be notified of any new findings that may affect your care.  Note: If you are age 65 or older, or if you have a chronic lung disease or condition that affects your immune system, or you smoke, talk to your healthcare provider about having a pneumococcal vaccinations and a yearly influenza vaccination (flu shot).  When to seek medical advice  Call your healthcare provider right away if any of these occur:  · Fever of 100.4°F (38°C) or higher  · Coughing up increasing amounts of colored sputum  · Weakness, drowsiness, headache, facial pain, ear pain, or a stiff neck  Call 911, or get immediate medical care  Contact emergency services right away if any of these occur.  · Coughing up blood  · Worsening weakness, drowsiness, headache, or stiff neck  · Increased wheezing not helped with medication, shortness of breath, or pain with breathing  Date Last Reviewed: 9/13/2015  © 6903-6146 [employer]. [address] [place] [address], [place] [address]. All rights reserved. This information is not intended as a substitute for professional medical care. Always follow your healthcare professional's instructions.

## 2017-12-20 ENCOUNTER — TELEPHONE (OUTPATIENT)
Dept: URGENT CARE | Facility: CLINIC | Age: 42
End: 2017-12-20

## 2017-12-29 DIAGNOSIS — M54.42 CHRONIC MIDLINE LOW BACK PAIN WITH LEFT-SIDED SCIATICA: ICD-10-CM

## 2017-12-29 DIAGNOSIS — M51.36 ANNULAR TEAR OF LUMBAR DISC: ICD-10-CM

## 2017-12-29 DIAGNOSIS — G89.29 CHRONIC MIDLINE LOW BACK PAIN WITH LEFT-SIDED SCIATICA: ICD-10-CM

## 2017-12-29 DIAGNOSIS — M51.9 LUMBAR DISC DISEASE: ICD-10-CM

## 2017-12-29 RX ORDER — TRAMADOL HYDROCHLORIDE 50 MG/1
TABLET ORAL
Qty: 60 TABLET | Refills: 0 | Status: SHIPPED | OUTPATIENT
Start: 2017-12-29 | End: 2018-01-31 | Stop reason: SDUPTHER

## 2017-12-29 NOTE — TELEPHONE ENCOUNTER
----- Message from Audrey Gleason sent at 12/29/2017 12:45 PM CST -----  Contact: [phone]/self  Patient is requesting to have a refill of traMADol (ULTRAM) 50 mg tablet sent to [facility] in [place] . Please call and advise.

## 2018-01-30 DIAGNOSIS — J20.9 ACUTE BRONCHITIS WITH BRONCHOSPASM: ICD-10-CM

## 2018-01-30 RX ORDER — ALBUTEROL SULFATE 90 UG/1
2 AEROSOL, METERED RESPIRATORY (INHALATION) EVERY 4 HOURS PRN
Qty: 1 INHALER | Refills: 0 | Status: SHIPPED | OUTPATIENT
Start: 2018-01-30 | End: 2020-02-05 | Stop reason: SDUPTHER

## 2018-01-30 NOTE — TELEPHONE ENCOUNTER
----- Message from Aracely Casillas sent at 1/30/2018 12:36 PM CST -----  Contact: Self/ [phone]  Patient called in to get prescription refill.     Please call and advise.     traMADol (ULTRAM) 50 mg tablet    [facility] - [place] [address] - [address] AT [facility] [place] [address]

## 2018-01-31 DIAGNOSIS — M54.42 CHRONIC MIDLINE LOW BACK PAIN WITH LEFT-SIDED SCIATICA: ICD-10-CM

## 2018-01-31 DIAGNOSIS — M51.36 ANNULAR TEAR OF LUMBAR DISC: ICD-10-CM

## 2018-01-31 DIAGNOSIS — M51.9 LUMBAR DISC DISEASE: ICD-10-CM

## 2018-01-31 DIAGNOSIS — G89.29 CHRONIC MIDLINE LOW BACK PAIN WITH LEFT-SIDED SCIATICA: ICD-10-CM

## 2018-01-31 RX ORDER — TRAMADOL HYDROCHLORIDE 50 MG/1
TABLET ORAL
Qty: 60 TABLET | Refills: 0 | Status: SHIPPED | OUTPATIENT
Start: 2018-01-31 | End: 2018-03-21 | Stop reason: SDUPTHER

## 2018-03-21 DIAGNOSIS — M54.42 CHRONIC MIDLINE LOW BACK PAIN WITH LEFT-SIDED SCIATICA: ICD-10-CM

## 2018-03-21 DIAGNOSIS — M51.36 ANNULAR TEAR OF LUMBAR DISC: ICD-10-CM

## 2018-03-21 DIAGNOSIS — M51.9 LUMBAR DISC DISEASE: ICD-10-CM

## 2018-03-21 DIAGNOSIS — G89.29 CHRONIC MIDLINE LOW BACK PAIN WITH LEFT-SIDED SCIATICA: ICD-10-CM

## 2018-03-22 RX ORDER — TRAMADOL HYDROCHLORIDE 50 MG/1
TABLET ORAL
Qty: 60 TABLET | Refills: 0 | Status: SHIPPED | OUTPATIENT
Start: 2018-03-22 | End: 2018-04-23 | Stop reason: SDUPTHER

## 2018-04-23 DIAGNOSIS — G89.29 CHRONIC MIDLINE LOW BACK PAIN WITH LEFT-SIDED SCIATICA: ICD-10-CM

## 2018-04-23 DIAGNOSIS — M54.42 CHRONIC MIDLINE LOW BACK PAIN WITH LEFT-SIDED SCIATICA: ICD-10-CM

## 2018-04-23 DIAGNOSIS — M51.9 LUMBAR DISC DISEASE: ICD-10-CM

## 2018-04-23 DIAGNOSIS — M51.36 ANNULAR TEAR OF LUMBAR DISC: ICD-10-CM

## 2018-04-24 DIAGNOSIS — M51.9 LUMBAR DISC DISEASE: ICD-10-CM

## 2018-04-24 DIAGNOSIS — M51.36 ANNULAR TEAR OF LUMBAR DISC: ICD-10-CM

## 2018-04-24 DIAGNOSIS — G89.29 CHRONIC MIDLINE LOW BACK PAIN WITH LEFT-SIDED SCIATICA: ICD-10-CM

## 2018-04-24 DIAGNOSIS — M54.42 CHRONIC MIDLINE LOW BACK PAIN WITH LEFT-SIDED SCIATICA: ICD-10-CM

## 2018-04-24 RX ORDER — TRAMADOL HYDROCHLORIDE 50 MG/1
TABLET ORAL
Qty: 60 TABLET | Refills: 0 | Status: SHIPPED | OUTPATIENT
Start: 2018-04-24 | End: 2018-06-22 | Stop reason: SDUPTHER

## 2018-04-24 RX ORDER — TRAMADOL HYDROCHLORIDE 50 MG/1
TABLET ORAL
Qty: 60 TABLET | Refills: 0 | OUTPATIENT
Start: 2018-04-24

## 2018-06-22 DIAGNOSIS — G89.29 CHRONIC MIDLINE LOW BACK PAIN WITH LEFT-SIDED SCIATICA: ICD-10-CM

## 2018-06-22 DIAGNOSIS — M51.9 LUMBAR DISC DISEASE: ICD-10-CM

## 2018-06-22 DIAGNOSIS — M51.36 ANNULAR TEAR OF LUMBAR DISC: ICD-10-CM

## 2018-06-22 DIAGNOSIS — M54.42 CHRONIC MIDLINE LOW BACK PAIN WITH LEFT-SIDED SCIATICA: ICD-10-CM

## 2018-06-22 RX ORDER — TRAMADOL HYDROCHLORIDE 50 MG/1
TABLET ORAL
Qty: 60 TABLET | Refills: 0 | Status: SHIPPED | OUTPATIENT
Start: 2018-06-22 | End: 2018-08-03 | Stop reason: SDUPTHER

## 2018-08-03 ENCOUNTER — TELEPHONE (OUTPATIENT)
Dept: FAMILY MEDICINE | Facility: CLINIC | Age: 43
End: 2018-08-03

## 2018-08-03 DIAGNOSIS — Z00.00 ROUTINE GENERAL MEDICAL EXAMINATION AT A HEALTH CARE FACILITY: Primary | ICD-10-CM

## 2018-08-03 DIAGNOSIS — M51.36 ANNULAR TEAR OF LUMBAR DISC: ICD-10-CM

## 2018-08-03 DIAGNOSIS — F41.9 ANXIETY: ICD-10-CM

## 2018-08-03 DIAGNOSIS — M54.42 CHRONIC MIDLINE LOW BACK PAIN WITH LEFT-SIDED SCIATICA: ICD-10-CM

## 2018-08-03 DIAGNOSIS — G89.29 CHRONIC MIDLINE LOW BACK PAIN WITH LEFT-SIDED SCIATICA: ICD-10-CM

## 2018-08-03 DIAGNOSIS — Z12.31 ENCOUNTER FOR SCREENING MAMMOGRAM FOR BREAST CANCER: ICD-10-CM

## 2018-08-03 DIAGNOSIS — M51.9 LUMBAR DISC DISEASE: ICD-10-CM

## 2018-08-03 NOTE — TELEPHONE ENCOUNTER
Dr. Perez placed orders for a Mammogram. Spoke to patient, patient stated she recently had a Mammogram at [facility]. Patient also states she will bring a copy of her results with her to her next visit.

## 2018-08-06 RX ORDER — TRAMADOL HYDROCHLORIDE 50 MG/1
TABLET ORAL
Qty: 60 TABLET | Refills: 0 | Status: SHIPPED | OUTPATIENT
Start: 2018-08-06 | End: 2018-09-06 | Stop reason: SDUPTHER

## 2018-08-09 ENCOUNTER — TELEPHONE (OUTPATIENT)
Dept: FAMILY MEDICINE | Facility: CLINIC | Age: 43
End: 2018-08-09

## 2018-08-09 NOTE — TELEPHONE ENCOUNTER
----- Message from Angely Traore sent at 8/9/2018 12:14 PM CDT -----  Contact: [phone]/self  Patient is requesting a call back. Please advise.

## 2018-08-18 ENCOUNTER — LAB VISIT (OUTPATIENT)
Dept: LAB | Facility: HOSPITAL | Age: 43
End: 2018-08-18
Attending: FAMILY MEDICINE
Payer: COMMERCIAL

## 2018-08-18 DIAGNOSIS — F41.9 ANXIETY: ICD-10-CM

## 2018-08-18 DIAGNOSIS — Z00.00 ROUTINE GENERAL MEDICAL EXAMINATION AT A HEALTH CARE FACILITY: ICD-10-CM

## 2018-08-18 LAB
25(OH)D3+25(OH)D2 SERPL-MCNC: 39 NG/ML
ALBUMIN SERPL BCP-MCNC: 4 G/DL
ALP SERPL-CCNC: 61 U/L
ALT SERPL W/O P-5'-P-CCNC: 9 U/L
ANION GAP SERPL CALC-SCNC: 8 MMOL/L
AST SERPL-CCNC: 17 U/L
BASOPHILS # BLD AUTO: 0.02 K/UL
BASOPHILS NFR BLD: 0.3 %
BILIRUB SERPL-MCNC: 0.5 MG/DL
BUN SERPL-MCNC: 10 MG/DL
CALCIUM SERPL-MCNC: 9.5 MG/DL
CHLORIDE SERPL-SCNC: 107 MMOL/L
CHOLEST SERPL-MCNC: 162 MG/DL
CHOLEST/HDLC SERPL: 2.5 {RATIO}
CO2 SERPL-SCNC: 24 MMOL/L
CREAT SERPL-MCNC: 0.8 MG/DL
DIFFERENTIAL METHOD: ABNORMAL
EOSINOPHIL # BLD AUTO: 0.1 K/UL
EOSINOPHIL NFR BLD: 1 %
ERYTHROCYTE [DISTWIDTH] IN BLOOD BY AUTOMATED COUNT: 13 %
EST. GFR  (AFRICAN AMERICAN): >60 ML/MIN/1.73 M^2
EST. GFR  (NON AFRICAN AMERICAN): >60 ML/MIN/1.73 M^2
GLUCOSE SERPL-MCNC: 98 MG/DL
HCT VFR BLD AUTO: 39.7 %
HDLC SERPL-MCNC: 65 MG/DL
HDLC SERPL: 40.1 %
HGB BLD-MCNC: 12.9 G/DL
LDLC SERPL CALC-MCNC: 81.4 MG/DL
LYMPHOCYTES # BLD AUTO: 1.6 K/UL
LYMPHOCYTES NFR BLD: 25.4 %
MCH RBC QN AUTO: 27.1 PG
MCHC RBC AUTO-ENTMCNC: 32.5 G/DL
MCV RBC AUTO: 83 FL
MONOCYTES # BLD AUTO: 0.6 K/UL
MONOCYTES NFR BLD: 9 %
NEUTROPHILS # BLD AUTO: 4.1 K/UL
NEUTROPHILS NFR BLD: 64.1 %
NONHDLC SERPL-MCNC: 97 MG/DL
PLATELET # BLD AUTO: 459 K/UL
PMV BLD AUTO: 9.5 FL
POTASSIUM SERPL-SCNC: 3.9 MMOL/L
PROT SERPL-MCNC: 7.3 G/DL
RBC # BLD AUTO: 4.76 M/UL
SODIUM SERPL-SCNC: 139 MMOL/L
TRIGL SERPL-MCNC: 78 MG/DL
TSH SERPL DL<=0.005 MIU/L-ACNC: 1.61 UIU/ML
WBC # BLD AUTO: 6.31 K/UL

## 2018-08-18 PROCEDURE — 36415 COLL VENOUS BLD VENIPUNCTURE: CPT

## 2018-08-18 PROCEDURE — 80053 COMPREHEN METABOLIC PANEL: CPT

## 2018-08-18 PROCEDURE — 82306 VITAMIN D 25 HYDROXY: CPT

## 2018-08-18 PROCEDURE — 84443 ASSAY THYROID STIM HORMONE: CPT

## 2018-08-18 PROCEDURE — 80061 LIPID PANEL: CPT

## 2018-08-18 PROCEDURE — 85025 COMPLETE CBC W/AUTO DIFF WBC: CPT

## 2018-08-21 ENCOUNTER — TELEPHONE (OUTPATIENT)
Dept: FAMILY MEDICINE | Facility: CLINIC | Age: 43
End: 2018-08-21

## 2018-08-21 NOTE — TELEPHONE ENCOUNTER
----- Message from Aracely Casillas sent at 8/21/2018  3:39 PM CDT -----  Contact: Self/ [phone]  Patient called in requesting to speak with you. Patient prefers to speak with a nurse.     Please call and advise.

## 2018-09-06 DIAGNOSIS — M51.36 ANNULAR TEAR OF LUMBAR DISC: ICD-10-CM

## 2018-09-06 DIAGNOSIS — M51.9 LUMBAR DISC DISEASE: ICD-10-CM

## 2018-09-06 DIAGNOSIS — G89.29 CHRONIC MIDLINE LOW BACK PAIN WITH LEFT-SIDED SCIATICA: ICD-10-CM

## 2018-09-06 DIAGNOSIS — M54.42 CHRONIC MIDLINE LOW BACK PAIN WITH LEFT-SIDED SCIATICA: ICD-10-CM

## 2018-09-06 RX ORDER — TRAMADOL HYDROCHLORIDE 50 MG/1
TABLET ORAL
Qty: 60 TABLET | Refills: 0 | OUTPATIENT
Start: 2018-09-06

## 2018-09-06 RX ORDER — TRAMADOL HYDROCHLORIDE 50 MG/1
TABLET ORAL
Qty: 60 TABLET | Refills: 0 | Status: SHIPPED | OUTPATIENT
Start: 2018-09-06 | End: 2018-10-08 | Stop reason: SDUPTHER

## 2018-09-06 NOTE — TELEPHONE ENCOUNTER
----- Message from Aracely Casillas sent at 9/6/2018 11:41 AM CDT -----  Contact: Self/ [phone]  Patient called in requesting to speak with you. Patient prefers to speak with a nurse.     Please call and advise.

## 2018-09-20 ENCOUNTER — OFFICE VISIT (OUTPATIENT)
Dept: FAMILY MEDICINE | Facility: CLINIC | Age: 43
End: 2018-09-20
Attending: FAMILY MEDICINE
Payer: COMMERCIAL

## 2018-09-20 VITALS
OXYGEN SATURATION: 97 % | DIASTOLIC BLOOD PRESSURE: 77 MMHG | HEIGHT: 66 IN | WEIGHT: 162.94 LBS | HEART RATE: 77 BPM | SYSTOLIC BLOOD PRESSURE: 111 MMHG | BODY MASS INDEX: 26.19 KG/M2

## 2018-09-20 DIAGNOSIS — G89.29 CHRONIC MIDLINE LOW BACK PAIN WITH LEFT-SIDED SCIATICA: ICD-10-CM

## 2018-09-20 DIAGNOSIS — M51.9 LUMBAR DISC DISEASE: ICD-10-CM

## 2018-09-20 DIAGNOSIS — M54.42 CHRONIC MIDLINE LOW BACK PAIN WITH LEFT-SIDED SCIATICA: ICD-10-CM

## 2018-09-20 DIAGNOSIS — Z00.00 ROUTINE GENERAL MEDICAL EXAMINATION AT A HEALTH CARE FACILITY: Primary | ICD-10-CM

## 2018-09-20 DIAGNOSIS — M51.36 ANNULAR TEAR OF LUMBAR DISC: ICD-10-CM

## 2018-09-20 PROCEDURE — 99396 PREV VISIT EST AGE 40-64: CPT | Mod: S$GLB,,, | Performed by: FAMILY MEDICINE

## 2018-09-20 PROCEDURE — 99999 PR PBB SHADOW E&M-EST. PATIENT-LVL III: CPT | Mod: PBBFAC,,, | Performed by: FAMILY MEDICINE

## 2018-09-20 NOTE — PROGRESS NOTES
Subjective:       Patient ID: Kimber Moran is a 42 y.o. female.    Chief Complaint: Annual Exam    42 yr old pleasant white female with chronic low back pain, lumbar disc disease, presents today for her annual wellness check, lab work and 3 month follow up and medication refills. No new concerns today.    Chronic LBP - onset many years ago - tried spine epidural injections and seen chiropractor and nothing helped her - she started taking tramadoland it helped - she also tried norco for severe pain - she was never offered physical therapy - she denies any surgery in the back and no car accidents - her MRI from 2013 showed mild degenerative change with no significant central canal or neural foraminal stenosis. L4-5 annular fissure. Of note, no neurological symptoms. She also requesting PT.        History as below - reviewed    HM  -labs UTD        Medication Refill   This is a chronic problem. The current episode started more than 1 year ago. The problem occurs constantly. The problem has been gradually improving. Associated symptoms include arthralgias, myalgias and a rash. Pertinent negatives include no chest pain, congestion, diaphoresis, headaches, nausea or sore throat. Nothing aggravates the symptoms. Treatments tried: ultracet. The treatment provided significant relief.   Back Pain   This is a chronic problem. The current episode started more than 1 year ago. The problem occurs constantly. The problem is unchanged. The pain is present in the lumbar spine. The quality of the pain is described as aching. The pain does not radiate. The pain is at a severity of 6/10. The pain is moderate. The symptoms are aggravated by bending, sitting and twisting. Pertinent negatives include no chest pain, dysuria, headaches, paresthesias, pelvic pain or tingling. She has tried analgesics for the symptoms. The treatment provided significant relief.     Review of Systems   Constitutional: Negative.  Negative for activity change,  diaphoresis and unexpected weight change.   HENT: Negative.  Negative for congestion, ear discharge, hearing loss, rhinorrhea, sore throat and voice change.    Eyes: Negative.  Negative for pain, discharge and visual disturbance.   Respiratory: Negative.  Negative for chest tightness, shortness of breath and wheezing.    Cardiovascular: Negative.  Negative for chest pain.   Gastrointestinal: Negative.  Negative for abdominal distention, anal bleeding, constipation and nausea.   Endocrine: Negative.  Negative for cold intolerance, polydipsia and polyuria.   Genitourinary: Negative.  Negative for decreased urine volume, difficulty urinating, dysuria, frequency, menstrual problem, pelvic pain and vaginal pain.   Musculoskeletal: Positive for arthralgias, back pain and myalgias. Negative for gait problem.   Skin: Positive for rash. Negative for color change, pallor and wound.   Allergic/Immunologic: Negative.  Negative for environmental allergies and immunocompromised state.   Neurological: Negative.  Negative for dizziness, tingling, tremors, seizures, speech difficulty, headaches and paresthesias.   Hematological: Negative.  Negative for adenopathy. Does not bruise/bleed easily.   Psychiatric/Behavioral: Negative.  Negative for agitation, confusion, decreased concentration, hallucinations, self-injury and suicidal ideas. The patient is not nervous/anxious.        Active Ambulatory Problems     Diagnosis Date Noted    Chronic low back pain     Anxiety     Recurrent genital herpes 11/26/2012    Lumbar disc disease 10/20/2015    Annular tear of lumbar disc 10/20/2015    SI (sacroiliac) joint dysfunction 11/17/2015    Sprain of tibiofibular ligament of left ankle 12/14/2017     Resolved Ambulatory Problems     Diagnosis Date Noted    Depression     Hematochezia 12/23/2016    Chronic bilateral low back pain 07/07/2017     Past Medical History:   Diagnosis Date    Anxiety     Chronic low back pain      Depression      Past Surgical History:   Procedure Laterality Date    COLONOSCOPY N/A 12/23/2016    Procedure: COLONOSCOPY - Miralax split prep;  Surgeon: Johann Aden MD;  Location: [facility];  Service: Endoscopy;  Laterality: N/A;    COLONOSCOPY - Miralax split prep N/A 12/23/2016    Performed by Johann Aden MD at [facility]    Tonsillectomy       History reviewed. No pertinent family history.  Social History     Socioeconomic History    Marital status:      Spouse name: Not on file    Number of children: Not on file    Years of education: Not on file    Highest education level: Not on file   Social Needs    Financial resource strain: Not on file    Food insecurity - worry: Not on file    Food insecurity - inability: Not on file    Transportation needs - medical: Not on file    Transportation needs - non-medical: Not on file   Occupational History    Not on file   Tobacco Use    Smoking status: Former Smoker    Smokeless tobacco: Never Used   Substance and Sexual Activity    Alcohol use: Yes     Alcohol/week: 0.6 oz     Types: 1 Glasses of wine per week     Comment: RARELY    Drug use: No    Sexual activity: Yes     Partners: Male   Other Topics Concern    Not on file   Social History Narrative    Not on file     Review of patient's allergies indicates:   Allergen Reactions    Sulfa (sulfonamide antibiotics) Hives and Itching     Current Outpatient Medications on File Prior to Visit   Medication Sig Dispense Refill    traMADol (ULTRAM) 50 mg tablet TAKE 1 TABLET BY MOUTH EVERY 12 HOURS AS NEEDED FOR FOR PAIN 60 tablet 0    albuterol 90 mcg/actuation inhaler Inhale 2 puffs into the lungs every 4 (four) hours as needed for Wheezing. Rescue 1 Inhaler 0     No current facility-administered medications on file prior to visit.        Objective:       Vitals:    09/20/18 0822   BP: 111/77   Pulse: 77       Physical Exam   Constitutional: She is oriented to person, place, and time.  She appears well-developed and well-nourished. No distress.   HENT:   Head: Normocephalic and atraumatic.   Right Ear: External ear normal.   Left Ear: External ear normal.   Nose: Nose normal.   Mouth/Throat: Oropharynx is clear and moist. No oropharyngeal exudate.   Eyes: Conjunctivae and EOM are normal. Pupils are equal, round, and reactive to light. Right eye exhibits no discharge. Left eye exhibits no discharge. No scleral icterus.   Neck: Normal range of motion. Neck supple. No JVD present. No tracheal deviation present. No thyromegaly present.   Cardiovascular: Normal rate, regular rhythm, normal heart sounds and intact distal pulses. Exam reveals no gallop and no friction rub.   No murmur heard.  Pulmonary/Chest: Effort normal and breath sounds normal. No stridor. No respiratory distress. She has no wheezes. She has no rales. She exhibits no tenderness.   Abdominal: Soft. Bowel sounds are normal. She exhibits no distension and no mass. There is no tenderness. There is no rebound and no guarding. No hernia.   Musculoskeletal: Normal range of motion. She exhibits tenderness (TTP lumbar and paralumbar area. SLRT neg B/L. Gait normal.). She exhibits no edema.   Lymphadenopathy:     She has no cervical adenopathy.   Neurological: She is alert and oriented to person, place, and time. She has normal reflexes. She displays normal reflexes. No cranial nerve deficit. She exhibits normal muscle tone. Coordination normal.   Skin: Skin is warm and dry. No rash noted. She is not diaphoretic. No erythema. No pallor.   Psychiatric: She has a normal mood and affect. Her behavior is normal. Judgment and thought content normal.       Assessment:       1. Routine general medical examination at a health care facility    2. Annular tear of lumbar disc    3. Chronic midline low back pain with left-sided sciatica    4. Lumbar disc disease        Plan:       Kimber was seen today for annual exam.    Diagnoses and all orders for this  visit:    Routine general medical examination at a health care facility    Annular tear of lumbar disc    Chronic midline low back pain with left-sided sciatica    Lumbar disc disease      Wellness check  -normal exam  -labs        Chronic low back pain  -advised physical therapy  -home exercises  -limit use of tramadol - due to addictive potential      Spent adequate time in obtaining history and explaining differentials    40 minutes spent during this visit of which greater than 50% devoted to face-face counseling and coordination of care regarding diagnosis and management plan    RTC 6 months or prn

## 2018-10-08 DIAGNOSIS — M51.9 LUMBAR DISC DISEASE: ICD-10-CM

## 2018-10-08 DIAGNOSIS — M54.42 CHRONIC MIDLINE LOW BACK PAIN WITH LEFT-SIDED SCIATICA: ICD-10-CM

## 2018-10-08 DIAGNOSIS — M51.36 ANNULAR TEAR OF LUMBAR DISC: ICD-10-CM

## 2018-10-08 DIAGNOSIS — G89.29 CHRONIC MIDLINE LOW BACK PAIN WITH LEFT-SIDED SCIATICA: ICD-10-CM

## 2018-10-08 RX ORDER — TRAMADOL HYDROCHLORIDE 50 MG/1
TABLET ORAL
Qty: 60 TABLET | Refills: 0 | Status: SHIPPED | OUTPATIENT
Start: 2018-10-08 | End: 2018-11-09 | Stop reason: SDUPTHER

## 2018-11-09 DIAGNOSIS — M51.9 LUMBAR DISC DISEASE: ICD-10-CM

## 2018-11-09 DIAGNOSIS — G89.29 CHRONIC MIDLINE LOW BACK PAIN WITH LEFT-SIDED SCIATICA: ICD-10-CM

## 2018-11-09 DIAGNOSIS — M51.36 ANNULAR TEAR OF LUMBAR DISC: ICD-10-CM

## 2018-11-09 DIAGNOSIS — M54.42 CHRONIC MIDLINE LOW BACK PAIN WITH LEFT-SIDED SCIATICA: ICD-10-CM

## 2018-11-12 DIAGNOSIS — M51.36 ANNULAR TEAR OF LUMBAR DISC: ICD-10-CM

## 2018-11-12 DIAGNOSIS — M51.9 LUMBAR DISC DISEASE: ICD-10-CM

## 2018-11-12 DIAGNOSIS — G89.29 CHRONIC MIDLINE LOW BACK PAIN WITH LEFT-SIDED SCIATICA: ICD-10-CM

## 2018-11-12 DIAGNOSIS — M54.42 CHRONIC MIDLINE LOW BACK PAIN WITH LEFT-SIDED SCIATICA: ICD-10-CM

## 2018-11-12 RX ORDER — TRAMADOL HYDROCHLORIDE 50 MG/1
TABLET ORAL
Qty: 60 TABLET | Refills: 0 | Status: SHIPPED | OUTPATIENT
Start: 2018-11-12 | End: 2018-12-13 | Stop reason: SDUPTHER

## 2018-11-13 RX ORDER — TRAMADOL HYDROCHLORIDE 50 MG/1
TABLET ORAL
Qty: 60 TABLET | Refills: 0 | OUTPATIENT
Start: 2018-11-13

## 2018-11-13 NOTE — TELEPHONE ENCOUNTER
----- Message from Milena Austin sent at 11/12/2018  4:51 PM CST -----  Contact: self, [phone]  Patient states her pharmacy did not received prescription faxed in today. Please advise.

## 2018-12-13 DIAGNOSIS — M51.36 ANNULAR TEAR OF LUMBAR DISC: ICD-10-CM

## 2018-12-13 DIAGNOSIS — M51.9 LUMBAR DISC DISEASE: ICD-10-CM

## 2018-12-13 DIAGNOSIS — G89.29 CHRONIC MIDLINE LOW BACK PAIN WITH LEFT-SIDED SCIATICA: ICD-10-CM

## 2018-12-13 DIAGNOSIS — M54.42 CHRONIC MIDLINE LOW BACK PAIN WITH LEFT-SIDED SCIATICA: ICD-10-CM

## 2018-12-13 RX ORDER — TRAMADOL HYDROCHLORIDE 50 MG/1
TABLET ORAL
Qty: 60 TABLET | Refills: 0 | Status: SHIPPED | OUTPATIENT
Start: 2018-12-13 | End: 2019-02-25 | Stop reason: SDUPTHER

## 2019-02-25 DIAGNOSIS — M51.36 ANNULAR TEAR OF LUMBAR DISC: ICD-10-CM

## 2019-02-25 DIAGNOSIS — G89.29 CHRONIC MIDLINE LOW BACK PAIN WITH LEFT-SIDED SCIATICA: ICD-10-CM

## 2019-02-25 DIAGNOSIS — M54.42 CHRONIC MIDLINE LOW BACK PAIN WITH LEFT-SIDED SCIATICA: ICD-10-CM

## 2019-02-25 DIAGNOSIS — M51.9 LUMBAR DISC DISEASE: ICD-10-CM

## 2019-02-25 RX ORDER — TRAMADOL HYDROCHLORIDE 50 MG/1
TABLET ORAL
Qty: 60 TABLET | Refills: 0 | Status: SHIPPED | OUTPATIENT
Start: 2019-02-25 | End: 2019-03-22 | Stop reason: SDUPTHER

## 2019-03-22 DIAGNOSIS — M51.9 LUMBAR DISC DISEASE: ICD-10-CM

## 2019-03-22 DIAGNOSIS — M51.36 ANNULAR TEAR OF LUMBAR DISC: ICD-10-CM

## 2019-03-22 DIAGNOSIS — G89.29 CHRONIC MIDLINE LOW BACK PAIN WITH LEFT-SIDED SCIATICA: ICD-10-CM

## 2019-03-22 DIAGNOSIS — M54.42 CHRONIC MIDLINE LOW BACK PAIN WITH LEFT-SIDED SCIATICA: ICD-10-CM

## 2019-03-22 RX ORDER — TRAMADOL HYDROCHLORIDE 50 MG/1
TABLET ORAL
Qty: 60 TABLET | Refills: 0 | Status: SHIPPED | OUTPATIENT
Start: 2019-03-22 | End: 2019-04-23 | Stop reason: SDUPTHER

## 2019-04-23 DIAGNOSIS — G89.29 CHRONIC MIDLINE LOW BACK PAIN WITH LEFT-SIDED SCIATICA: ICD-10-CM

## 2019-04-23 DIAGNOSIS — M51.36 ANNULAR TEAR OF LUMBAR DISC: ICD-10-CM

## 2019-04-23 DIAGNOSIS — M51.9 LUMBAR DISC DISEASE: ICD-10-CM

## 2019-04-23 DIAGNOSIS — M54.42 CHRONIC MIDLINE LOW BACK PAIN WITH LEFT-SIDED SCIATICA: ICD-10-CM

## 2019-04-23 RX ORDER — TRAMADOL HYDROCHLORIDE 50 MG/1
TABLET ORAL
Qty: 60 TABLET | Refills: 0 | Status: SHIPPED | OUTPATIENT
Start: 2019-04-23 | End: 2019-05-23 | Stop reason: SDUPTHER

## 2019-05-23 DIAGNOSIS — M51.36 ANNULAR TEAR OF LUMBAR DISC: ICD-10-CM

## 2019-05-23 DIAGNOSIS — G89.29 CHRONIC MIDLINE LOW BACK PAIN WITH LEFT-SIDED SCIATICA: ICD-10-CM

## 2019-05-23 DIAGNOSIS — M54.42 CHRONIC MIDLINE LOW BACK PAIN WITH LEFT-SIDED SCIATICA: ICD-10-CM

## 2019-05-23 DIAGNOSIS — M51.9 LUMBAR DISC DISEASE: ICD-10-CM

## 2019-05-24 RX ORDER — TRAMADOL HYDROCHLORIDE 50 MG/1
TABLET ORAL
Qty: 60 TABLET | Refills: 0 | Status: SHIPPED | OUTPATIENT
Start: 2019-05-24 | End: 2019-07-01 | Stop reason: SDUPTHER

## 2019-07-01 DIAGNOSIS — G89.29 CHRONIC MIDLINE LOW BACK PAIN WITH LEFT-SIDED SCIATICA: ICD-10-CM

## 2019-07-01 DIAGNOSIS — M54.42 CHRONIC MIDLINE LOW BACK PAIN WITH LEFT-SIDED SCIATICA: ICD-10-CM

## 2019-07-01 DIAGNOSIS — M51.9 LUMBAR DISC DISEASE: ICD-10-CM

## 2019-07-01 DIAGNOSIS — M51.36 ANNULAR TEAR OF LUMBAR DISC: ICD-10-CM

## 2019-07-03 ENCOUNTER — TELEPHONE (OUTPATIENT)
Dept: FAMILY MEDICINE | Facility: CLINIC | Age: 44
End: 2019-07-03

## 2019-07-03 RX ORDER — TRAMADOL HYDROCHLORIDE 50 MG/1
TABLET ORAL
Qty: 60 TABLET | Refills: 0 | Status: SHIPPED | OUTPATIENT
Start: 2019-07-03 | End: 2019-08-01 | Stop reason: SDUPTHER

## 2019-07-03 NOTE — TELEPHONE ENCOUNTER
----- Message from Gabriella Peters sent at 7/3/2019 10:56 AM CDT -----  Contact: [phone]/self  Type:  RX Refill Request    Who Called: pt  Refill or New Rx: refill  RX Name and Strength: traMADol (ULTRAM) 50 mg tablet  How is the patient currently taking it? (ex. 1XDay): TAKE 1 TABLET BY MOUTH EVERY 12 HOURS  Is this a 30 day or 90 day RX: 30 day  [facility] phone [facility]  Local or Mail Order: local  Ordering Provider: Dr. Perez  Would the patient rather a call back or a response via [facility]? Call back  Best Call Back Number: [phone]  Additional Information: pt is going out of town at 3pm today

## 2019-07-29 ENCOUNTER — TELEPHONE (OUTPATIENT)
Dept: FAMILY MEDICINE | Facility: CLINIC | Age: 44
End: 2019-07-29

## 2019-07-29 NOTE — TELEPHONE ENCOUNTER
----- Message from Nicolle Hartman sent at 7/29/2019  7:01 AM CDT -----  Contact: Patient came into office  Patient came into the office this morning, she dropped off an envelope with a letter inside and the information that she would like to relate to you. Patient stated that she is scheduled for an appointment already and that she would like to have labs as well. Patient asked that she be notified. I place the envelope in the bin for Family Medicine.

## 2019-08-01 DIAGNOSIS — G89.29 CHRONIC MIDLINE LOW BACK PAIN WITH LEFT-SIDED SCIATICA: ICD-10-CM

## 2019-08-01 DIAGNOSIS — M51.36 ANNULAR TEAR OF LUMBAR DISC: ICD-10-CM

## 2019-08-01 DIAGNOSIS — M51.9 LUMBAR DISC DISEASE: ICD-10-CM

## 2019-08-01 DIAGNOSIS — M54.42 CHRONIC MIDLINE LOW BACK PAIN WITH LEFT-SIDED SCIATICA: ICD-10-CM

## 2019-08-02 DIAGNOSIS — G89.29 CHRONIC MIDLINE LOW BACK PAIN WITH LEFT-SIDED SCIATICA: ICD-10-CM

## 2019-08-02 DIAGNOSIS — M51.36 ANNULAR TEAR OF LUMBAR DISC: ICD-10-CM

## 2019-08-02 DIAGNOSIS — M51.9 LUMBAR DISC DISEASE: ICD-10-CM

## 2019-08-02 DIAGNOSIS — M54.42 CHRONIC MIDLINE LOW BACK PAIN WITH LEFT-SIDED SCIATICA: ICD-10-CM

## 2019-08-02 RX ORDER — TRAMADOL HYDROCHLORIDE 50 MG/1
TABLET ORAL
Qty: 60 TABLET | Refills: 0 | Status: SHIPPED | OUTPATIENT
Start: 2019-08-02 | End: 2019-09-23

## 2019-08-05 RX ORDER — TRAMADOL HYDROCHLORIDE 50 MG/1
TABLET ORAL
Qty: 60 TABLET | Refills: 0 | Status: SHIPPED | OUTPATIENT
Start: 2019-08-05 | End: 2019-09-23

## 2019-08-06 ENCOUNTER — TELEPHONE (OUTPATIENT)
Dept: FAMILY MEDICINE | Facility: CLINIC | Age: 44
End: 2019-08-06

## 2019-08-06 DIAGNOSIS — Z12.31 ENCOUNTER FOR SCREENING MAMMOGRAM FOR BREAST CANCER: ICD-10-CM

## 2019-08-06 DIAGNOSIS — M54.42 CHRONIC MIDLINE LOW BACK PAIN WITH LEFT-SIDED SCIATICA: ICD-10-CM

## 2019-08-06 DIAGNOSIS — G89.29 CHRONIC MIDLINE LOW BACK PAIN WITH LEFT-SIDED SCIATICA: ICD-10-CM

## 2019-08-06 DIAGNOSIS — Z00.00 ROUTINE GENERAL MEDICAL EXAMINATION AT A HEALTH CARE FACILITY: Primary | ICD-10-CM

## 2019-08-06 DIAGNOSIS — F41.9 ANXIETY: ICD-10-CM

## 2019-08-06 DIAGNOSIS — M51.9 LUMBAR DISC DISEASE: ICD-10-CM

## 2019-08-06 RX ORDER — TRAMADOL HYDROCHLORIDE 50 MG/1
TABLET ORAL
Qty: 60 TABLET | Refills: 0 | Status: SHIPPED | OUTPATIENT
Start: 2019-08-06 | End: 2019-09-23

## 2019-08-06 NOTE — TELEPHONE ENCOUNTER
Pt requesting prior labs to Physical in September. Pls schedule on 9/21 at [facility]. Pls advise.

## 2019-09-21 ENCOUNTER — HOSPITAL ENCOUNTER (OUTPATIENT)
Dept: RADIOLOGY | Facility: HOSPITAL | Age: 44
Discharge: HOME | End: 2019-09-21
Attending: FAMILY MEDICINE
Payer: COMMERCIAL

## 2019-09-21 DIAGNOSIS — Z12.31 ENCOUNTER FOR SCREENING MAMMOGRAM FOR BREAST CANCER: ICD-10-CM

## 2019-09-21 PROCEDURE — 77067 SCR MAMMO BI INCL CAD: CPT | Mod: 26,,, | Performed by: RADIOLOGY

## 2019-09-21 PROCEDURE — 77067 MAMMO DIGITAL SCREENING BILAT WITH TOMOSYNTHESIS_CAD: ICD-10-PCS | Mod: 26,,, | Performed by: RADIOLOGY

## 2019-09-21 PROCEDURE — 77063 MAMMO DIGITAL SCREENING BILAT WITH TOMOSYNTHESIS_CAD: ICD-10-PCS | Mod: 26,,, | Performed by: RADIOLOGY

## 2019-09-21 PROCEDURE — 77067 SCR MAMMO BI INCL CAD: CPT | Mod: TC

## 2019-09-21 PROCEDURE — 77063 BREAST TOMOSYNTHESIS BI: CPT | Mod: 26,,, | Performed by: RADIOLOGY

## 2019-09-23 ENCOUNTER — OFFICE VISIT (OUTPATIENT)
Dept: FAMILY MEDICINE | Facility: CLINIC | Age: 44
End: 2019-09-23
Attending: FAMILY MEDICINE
Payer: COMMERCIAL

## 2019-09-23 VITALS
HEIGHT: 66 IN | DIASTOLIC BLOOD PRESSURE: 76 MMHG | BODY MASS INDEX: 22.46 KG/M2 | HEART RATE: 66 BPM | OXYGEN SATURATION: 100 % | WEIGHT: 139.75 LBS | SYSTOLIC BLOOD PRESSURE: 112 MMHG

## 2019-09-23 DIAGNOSIS — M54.42 CHRONIC MIDLINE LOW BACK PAIN WITH LEFT-SIDED SCIATICA: ICD-10-CM

## 2019-09-23 DIAGNOSIS — G89.29 CHRONIC MIDLINE LOW BACK PAIN WITH LEFT-SIDED SCIATICA: ICD-10-CM

## 2019-09-23 DIAGNOSIS — M67.40 GANGLION CYST: ICD-10-CM

## 2019-09-23 DIAGNOSIS — M53.3 SI (SACROILIAC) JOINT DYSFUNCTION: ICD-10-CM

## 2019-09-23 DIAGNOSIS — Z00.00 ROUTINE GENERAL MEDICAL EXAMINATION AT A HEALTH CARE FACILITY: ICD-10-CM

## 2019-09-23 DIAGNOSIS — M51.9 LUMBAR DISC DISEASE: ICD-10-CM

## 2019-09-23 DIAGNOSIS — M51.36 ANNULAR TEAR OF LUMBAR DISC: ICD-10-CM

## 2019-09-23 PROCEDURE — 99396 PREV VISIT EST AGE 40-64: CPT | Mod: S$GLB,,, | Performed by: FAMILY MEDICINE

## 2019-09-23 PROCEDURE — 99396 PR PREVENTIVE VISIT,EST,40-64: ICD-10-PCS | Mod: S$GLB,,, | Performed by: FAMILY MEDICINE

## 2019-09-23 PROCEDURE — 99999 PR PBB SHADOW E&M-EST. PATIENT-LVL IV: CPT | Mod: PBBFAC,,, | Performed by: FAMILY MEDICINE

## 2019-09-23 PROCEDURE — 99999 PR PBB SHADOW E&M-EST. PATIENT-LVL IV: ICD-10-PCS | Mod: PBBFAC,,, | Performed by: FAMILY MEDICINE

## 2019-09-23 RX ORDER — TRAMADOL HYDROCHLORIDE 50 MG/1
50 TABLET ORAL EVERY 12 HOURS
Qty: 60 TABLET | Refills: 2 | Status: SHIPPED | OUTPATIENT
Start: 2019-09-23 | End: 2020-01-07

## 2019-09-23 NOTE — PROGRESS NOTES
Subjective:       Patient ID: Kimber Moran is a 44 y.o. female.    Chief Complaint: Annual Exam and Mass (Top of Left Wrist)    43 yr old pleasant white female with chronic low back pain, lumbar disc disease, presents today for her annual wellness check, lab work and 3 month follow up and medication refills. C/o cyst in her left wrist and painful.    Chronic LBP - onset many years ago - tried spine epidural injections and seen chiropractor and nothing helped her - she started taking tramadoland it helped - she also tried norco for severe pain - she was never offered physical therapy - she denies any surgery in the back and no car accidents - her MRI from 2013 showed mild degenerative change with no significant central canal or neural foraminal stenosis. L4-5 annular fissure. Of note, no neurological symptoms. She also requesting PT.        History as below - reviewed    HM  -labs UTD      Cyst   This is a chronic problem. The current episode started more than 1 month ago. The problem occurs constantly. The problem has been gradually worsening. Associated symptoms include joint swelling, myalgias and a rash. Pertinent negatives include no abdominal pain, arthralgias, chest pain, congestion, diaphoresis, fatigue, headaches, nausea, neck pain, sore throat, swollen glands, vertigo or vomiting. The symptoms are aggravated by bending. She has tried nothing for the symptoms. The treatment provided no relief.   Medication Refill   This is a chronic problem. The current episode started more than 1 year ago. The problem occurs constantly. The problem has been gradually improving. Associated symptoms include joint swelling, myalgias and a rash. Pertinent negatives include no abdominal pain, arthralgias, chest pain, congestion, diaphoresis, fatigue, headaches, nausea, neck pain, sore throat, swollen glands, vertigo or vomiting. Nothing aggravates the symptoms. Treatments tried: ultracet. The treatment provided significant  relief.   Back Pain   This is a chronic problem. The current episode started more than 1 year ago. The problem occurs constantly. The problem is unchanged. The pain is present in the lumbar spine. The quality of the pain is described as aching. The pain does not radiate. The pain is at a severity of 6/10. The pain is moderate. The symptoms are aggravated by bending, sitting and twisting. Pertinent negatives include no abdominal pain, chest pain, dysuria, headaches, paresthesias, pelvic pain or tingling. She has tried analgesics for the symptoms. The treatment provided significant relief.     Review of Systems   Constitutional: Negative.  Negative for activity change, diaphoresis, fatigue and unexpected weight change.   HENT: Negative.  Negative for congestion, ear discharge, hearing loss, rhinorrhea, sore throat and voice change.    Eyes: Negative.  Negative for pain, discharge and visual disturbance.   Respiratory: Negative.  Negative for chest tightness, shortness of breath and wheezing.    Cardiovascular: Negative.  Negative for chest pain.   Gastrointestinal: Negative.  Negative for abdominal distention, abdominal pain, anal bleeding, constipation, nausea and vomiting.   Endocrine: Negative.  Negative for cold intolerance, polydipsia and polyuria.   Genitourinary: Negative.  Negative for decreased urine volume, difficulty urinating, dysuria, frequency, menstrual problem, pelvic pain and vaginal pain.   Musculoskeletal: Positive for back pain, joint swelling and myalgias. Negative for arthralgias, gait problem and neck pain.   Skin: Positive for rash. Negative for color change, pallor and wound.   Allergic/Immunologic: Negative.  Negative for environmental allergies and immunocompromised state.   Neurological: Negative.  Negative for dizziness, vertigo, tingling, tremors, seizures, speech difficulty, headaches and paresthesias.   Hematological: Negative.  Negative for adenopathy. Does not bruise/bleed easily.    Psychiatric/Behavioral: Negative.  Negative for agitation, confusion, decreased concentration, hallucinations, self-injury and suicidal ideas. The patient is not nervous/anxious.        Active Ambulatory Problems     Diagnosis Date Noted    Chronic low back pain     Anxiety     Recurrent genital herpes 11/26/2012    Lumbar disc disease 10/20/2015    Annular tear of lumbar disc 10/20/2015    SI (sacroiliac) joint dysfunction 11/17/2015    Sprain of tibiofibular ligament of left ankle 12/14/2017    Ganglion cyst of dorsum of left wrist 10/21/2019     Resolved Ambulatory Problems     Diagnosis Date Noted    Depression     Hematochezia 12/23/2016    Chronic bilateral low back pain 07/07/2017     No Additional Past Medical History     Past Surgical History:   Procedure Laterality Date    COLONOSCOPY N/A 12/23/2016    Procedure: COLONOSCOPY - Miralax split prep;  Surgeon: Johann Aden MD;  Location: [facility];  Service: Endoscopy;  Laterality: N/A;    Tonsillectomy       History reviewed. No pertinent family history.  Social History     Socioeconomic History    Marital status:      Spouse name: Not on file    Number of children: Not on file    Years of education: Not on file    Highest education level: Not on file   Occupational History    Not on file   Social Needs    Financial resource strain: Not on file    Food insecurity:     Worry: Not on file     Inability: Not on file    Transportation needs:     Medical: Not on file     Non-medical: Not on file   Tobacco Use    Smoking status: Former Smoker    Smokeless tobacco: Never Used   Substance and Sexual Activity    Alcohol use: Yes     Alcohol/week: 1.0 standard drinks     Types: 1 Glasses of wine per week     Comment: RARELY    Drug use: No    Sexual activity: Yes     Partners: Male   Lifestyle    Physical activity:     Days per week: Not on file     Minutes per session: Not on file    Stress: Not on file   Relationships     Social connections:     Talks on phone: Not on file     Gets together: Not on file     Attends [employer] service: Not on file     Active member of club or organization: Not on file     Attends meetings of clubs or organizations: Not on file     Relationship status: Not on file   Other Topics Concern    Not on file   Social History Narrative    Not on file     Review of patient's allergies indicates:   Allergen Reactions    Sulfa (sulfonamide antibiotics) Hives and Itching     Current Outpatient Medications on File Prior to Visit   Medication Sig Dispense Refill    albuterol 90 mcg/actuation inhaler Inhale 2 puffs into the lungs every 4 (four) hours as needed for Wheezing. Rescue 1 Inhaler 0     No current facility-administered medications on file prior to visit.        Objective:       Vitals:    09/23/19 1327   BP: 112/76   Pulse: 66       Physical Exam   Constitutional: She is oriented to person, place, and time. She appears well-developed and well-nourished. No distress.   HENT:   Head: Normocephalic and atraumatic.   Right Ear: External ear normal.   Left Ear: External ear normal.   Nose: Nose normal.   Mouth/Throat: Oropharynx is clear and moist. No oropharyngeal exudate.   Eyes: Pupils are equal, round, and reactive to light. Conjunctivae and EOM are normal. Right eye exhibits no discharge. Left eye exhibits no discharge. No scleral icterus.   Neck: Normal range of motion. Neck supple. No JVD present. No tracheal deviation present. No thyromegaly present.   Cardiovascular: Normal rate, regular rhythm, normal heart sounds and intact distal pulses. Exam reveals no gallop and no friction rub.   No murmur heard.  Pulmonary/Chest: Effort normal and breath sounds normal. No stridor. No respiratory distress. She has no wheezes. She has no rales. She exhibits no tenderness.   Abdominal: Soft. Bowel sounds are normal. She exhibits no distension and no mass. There is no tenderness. There is no rebound and no  guarding. No hernia.   Musculoskeletal: Normal range of motion. She exhibits tenderness (TTP lumbar and paralumbar area. SLRT neg B/L. Gait normal.). She exhibits no edema.   TTP left wrist and 1.5 cm ganglion mobile cyst left wrist dorsum   Lymphadenopathy:     She has no cervical adenopathy.   Neurological: She is alert and oriented to person, place, and time. She has normal reflexes. She displays normal reflexes. No cranial nerve deficit. She exhibits normal muscle tone. Coordination normal.   Skin: Skin is warm and dry. No rash noted. She is not diaphoretic. No erythema. No pallor.   Psychiatric: She has a normal mood and affect. Her behavior is normal. Judgment and thought content normal.       Lab Visit on 09/21/2019   Component Date Value Ref Range Status    WBC 09/21/2019 6.74  3.90 - 12.70 K/uL Final    RBC 09/21/2019 4.61  4.00 - 5.40 M/uL Final    Hemoglobin 09/21/2019 12.7  12.0 - 16.0 g/dL Final    Hematocrit 09/21/2019 39.5  37.0 - 48.5 % Final    Mean Corpuscular Volume 09/21/2019 86  82 - 98 fL Final    Mean Corpuscular Hemoglobin 09/21/2019 27.5  27.0 - 31.0 pg Final    Mean Corpuscular Hemoglobin Conc 09/21/2019 32.2  32.0 - 36.0 g/dL Final    RDW 09/21/2019 14.1  11.5 - 14.5 % Final    Platelets 09/21/2019 439* 150 - 350 K/uL Final    MPV 09/21/2019 9.2  9.2 - 12.9 fL Final    Gran # (ANC) 09/21/2019 4.4  1.8 - 7.7 K/uL Final    Lymph # 09/21/2019 1.8  1.0 - 4.8 K/uL Final    Mono # 09/21/2019 0.4  0.3 - 1.0 K/uL Final    Eos # 09/21/2019 0.0  0.0 - 0.5 K/uL Final    Baso # 09/21/2019 0.03  0.00 - 0.20 K/uL Final    Gran% 09/21/2019 65.9  38.0 - 73.0 % Final    Lymph% 09/21/2019 27.2  18.0 - 48.0 % Final    Mono% 09/21/2019 6.2  4.0 - 15.0 % Final    Eosinophil% 09/21/2019 0.3  0.0 - 8.0 % Final    Basophil% 09/21/2019 0.4  0.0 - 1.9 % Final    Differential Method 09/21/2019 Automated   Final    Sodium 09/21/2019 139  136 - 145 mmol/L Final    Potassium 09/21/2019 3.7  3.5 -  5.1 mmol/L Final    Chloride 09/21/2019 105  95 - 110 mmol/L Final    CO2 09/21/2019 25  23 - 29 mmol/L Final    Glucose 09/21/2019 95  70 - 110 mg/dL Final    BUN, Bld 09/21/2019 14  6 - 20 mg/dL Final    Creatinine 09/21/2019 0.9  0.5 - 1.4 mg/dL Final    Calcium 09/21/2019 9.5  8.7 - 10.5 mg/dL Final    Total Protein 09/21/2019 7.3  6.0 - 8.4 g/dL Final    Albumin 09/21/2019 4.2  3.5 - 5.2 g/dL Final    Total Bilirubin 09/21/2019 0.5  0.1 - 1.0 mg/dL Final    Comment: For infants and newborns, interpretation of results should be based  on gestational age, weight and in agreement with clinical  observations.  Premature Infant recommended reference ranges:  Up to 24 hours.............<8.0 mg/dL  Up to 48 hours............<12.0 mg/dL  3-5 days..................<15.0 mg/dL  6-29 days.................<15.0 mg/dL      Alkaline Phosphatase 09/21/2019 56  55 - 135 U/L Final    AST 09/21/2019 20  10 - 40 U/L Final    ALT 09/21/2019 15  10 - 44 U/L Final    Anion Gap 09/21/2019 9  8 - 16 mmol/L Final    eGFR if African American 09/21/2019 >60  >60 mL/min/1.73 m^2 Final    eGFR if non African American 09/21/2019 >60  >60 mL/min/1.73 m^2 Final    Comment: Calculation used to obtain the estimated glomerular filtration  rate (eGFR) is the CKD-EPI equation.       Cholesterol 09/21/2019 138  120 - 199 mg/dL Final    Comment: The National Cholesterol Education Program (NCEP) has set the  following guidelines (reference ranges) for Cholesterol:  Optimal.....................<200 mg/dL  Borderline High.............200-239 mg/dL  High........................> or = 240 mg/dL      Triglycerides 09/21/2019 50  30 - 150 mg/dL Final    Comment: The National Cholesterol Education Program (NCEP) has set the  following guidelines (reference values) for triglycerides:  Normal......................<150 mg/dL  Borderline High.............150-199 mg/dL  High........................200-499 mg/dL      HDL 09/21/2019 66  40 - 75  mg/dL Final    Comment: The National Cholesterol Education Program (NCEP) has set the  following guidelines (reference values) for HDL Cholesterol:  Low...............<40 mg/dL  Optimal...........>60 mg/dL      LDL Cholesterol 09/21/2019 62.0* 63.0 - 159.0 mg/dL Final    Comment: The National Cholesterol Education Program (NCEP) has set the  following guidelines (reference values) for LDL Cholesterol:  Optimal.......................<130 mg/dL  Borderline High...............130-159 mg/dL  High..........................160-189 mg/dL  Very High.....................>190 mg/dL      Hdl/Cholesterol Ratio 09/21/2019 47.8  20.0 - 50.0 % Final    Total Cholesterol/HDL Ratio 09/21/2019 2.1  2.0 - 5.0 Final    Non-HDL Cholesterol 09/21/2019 72  mg/dL Final    Comment: Risk category and Non-HDL cholesterol goals:  Coronary heart disease (CHD)or equivalent (10-year risk of CHD >20%):  Non-HDL cholesterol goal     <130 mg/dL  Two or more CHD risk factors and 10-year risk of CHD <= 20%:  Non-HDL cholesterol goal     <160 mg/dL  0 to 1 CHD risk factor:  Non-HDL cholesterol goal     <190 mg/dL      TSH 09/21/2019 1.370  0.400 - 4.000 uIU/mL Final    Vit D, 25-Hydroxy 09/21/2019 87  30 - 96 ng/mL Final    Comment: Vitamin D deficiency.........<10 ng/mL                              Vitamin D insufficiency......10-29 ng/mL       Vitamin D sufficiency........> or equal to 30 ng/mL  Vitamin D toxicity............>100 ng/mL         Assessment:       1. Routine general medical examination at a health care facility    2. Chronic midline low back pain with left-sided sciatica    3. SI (sacroiliac) joint dysfunction    4. Annular tear of lumbar disc    5. Lumbar disc disease    6. Ganglion cyst        Plan:           Kimber was seen today for annual exam and mass.    Diagnoses and all orders for this visit:    Routine general medical examination at a health care facility  -     CBC auto differential; Future  -     Comprehensive  metabolic panel; Future  -     Lipid panel; Future  -     TSH; Future  -     Vitamin D; Future    Chronic midline low back pain with left-sided sciatica  -     Discontinue: traMADol (ULTRAM) 50 mg tablet; Take 1 tablet (50 mg total) by mouth every 12 (twelve) hours.    SI (sacroiliac) joint dysfunction  -     Discontinue: traMADol (ULTRAM) 50 mg tablet; Take 1 tablet (50 mg total) by mouth every 12 (twelve) hours.    Annular tear of lumbar disc  -     Discontinue: traMADol (ULTRAM) 50 mg tablet; Take 1 tablet (50 mg total) by mouth every 12 (twelve) hours.    Lumbar disc disease  -     Discontinue: traMADol (ULTRAM) 50 mg tablet; Take 1 tablet (50 mg total) by mouth every 12 (twelve) hours.    Ganglion cyst  -     Ambulatory referral/consult to Orthopedics; Future        Wellness check  -normal exam  -labs        Chronic low back pain  -advised physical therapy  -home exercises  -limit use of tramadol - due to addictive potential    ganglion cyst left wrist  -refer ortho for removal      Spent adequate time in obtaining history and explaining differentials    40 minutes spent during this visit of which greater than 50% devoted to face-face counseling and coordination of care regarding diagnosis and management plan    Follow up in about 3 months (around 12/23/2019), or if symptoms worsen or fail to improve.

## 2019-09-24 ENCOUNTER — TELEPHONE (OUTPATIENT)
Dept: RADIOLOGY | Facility: HOSPITAL | Age: 44
End: 2019-09-24

## 2019-10-21 ENCOUNTER — OFFICE VISIT (OUTPATIENT)
Dept: ORTHOPEDICS | Facility: CLINIC | Age: 44
End: 2019-10-21
Attending: FAMILY MEDICINE
Payer: COMMERCIAL

## 2019-10-21 VITALS
TEMPERATURE: 99 F | SYSTOLIC BLOOD PRESSURE: 113 MMHG | WEIGHT: 139 LBS | HEART RATE: 72 BPM | BODY MASS INDEX: 22.34 KG/M2 | HEIGHT: 66 IN | DIASTOLIC BLOOD PRESSURE: 79 MMHG

## 2019-10-21 DIAGNOSIS — M67.40 GANGLION CYST: ICD-10-CM

## 2019-10-21 DIAGNOSIS — M67.432 GANGLION CYST OF DORSUM OF LEFT WRIST: ICD-10-CM

## 2019-10-21 PROCEDURE — 99999 PR PBB SHADOW E&M-EST. PATIENT-LVL III: ICD-10-PCS | Mod: PBBFAC,,, | Performed by: ORTHOPAEDIC SURGERY

## 2019-10-21 PROCEDURE — 20612 ASPIRATE/INJ GANGLION CYST: CPT | Mod: LT,S$GLB,, | Performed by: ORTHOPAEDIC SURGERY

## 2019-10-21 PROCEDURE — 99999 PR PBB SHADOW E&M-EST. PATIENT-LVL III: CPT | Mod: PBBFAC,,, | Performed by: ORTHOPAEDIC SURGERY

## 2019-10-21 PROCEDURE — 99203 PR OFFICE/OUTPT VISIT, NEW, LEVL III, 30-44 MIN: ICD-10-PCS | Mod: 25,S$GLB,, | Performed by: ORTHOPAEDIC SURGERY

## 2019-10-21 PROCEDURE — 3008F BODY MASS INDEX DOCD: CPT | Mod: CPTII,S$GLB,, | Performed by: ORTHOPAEDIC SURGERY

## 2019-10-21 PROCEDURE — 20612 PR ASPIRAT/INJECTION GANGLION CYST(S): ICD-10-PCS | Mod: LT,S$GLB,, | Performed by: ORTHOPAEDIC SURGERY

## 2019-10-21 PROCEDURE — 99203 OFFICE O/P NEW LOW 30 MIN: CPT | Mod: 25,S$GLB,, | Performed by: ORTHOPAEDIC SURGERY

## 2019-10-21 PROCEDURE — 3008F PR BODY MASS INDEX (BMI) DOCUMENTED: ICD-10-PCS | Mod: CPTII,S$GLB,, | Performed by: ORTHOPAEDIC SURGERY

## 2019-10-21 RX ORDER — TRIAMCINOLONE ACETONIDE 40 MG/ML
20 INJECTION, SUSPENSION INTRA-ARTICULAR; INTRAMUSCULAR
Status: COMPLETED | OUTPATIENT
Start: 2019-10-21 | End: 2019-10-21

## 2019-10-21 RX ADMIN — TRIAMCINOLONE ACETONIDE 20 MG: 40 INJECTION, SUSPENSION INTRA-ARTICULAR; INTRAMUSCULAR at 04:10

## 2019-10-21 NOTE — LETTER
October 21, 2019      Julio Cesar Perez MD  [address]  [address]  [facility] [address]           [facility]  [address]  [facility] [address]  Phone: [phone]          Patient: Kimber Moran   MR Number: [id number]   YOB: 1975   Date of Visit: 10/21/2019       Dear Dr. Julio Cesar Perez:    Thank you for referring Kimber Moran to me for evaluation. Attached you will find relevant portions of my assessment and plan of care.    If you have questions, please do not hesitate to call me. I look forward to following Kimber Moran along with you.    Sincerely,    Maikol Parish Jr., MD    Enclosure  CC:  No Recipients    If you would like to receive this communication electronically, please contact [email or web address] or [phone] to request more information on [facility] access.    For providers and/or their staff who would like to refer a patient to [facility], please contact us through our one-stop-shop provider referral line, [facility], at [phone].    If you feel you have received this communication in error or would no longer like to receive these types of communications, please e-mail [email or web address]

## 2019-10-21 NOTE — PROGRESS NOTES
"Subjective:      Patient ID: Kimber Moran is a 43 y.o. female.    Chief Complaint: Consult and Ganglion Cyst (left wrist )      HPI  Kimber Moran is a  43 y.o. female presenting today for painful mass left wrist.  There was not a history of trauma.  Onset of symptoms began several months ago and may have been brought on by boxing which she does not the gym  It is little painful to her specially when she flexes the wrist down for certain activities and G in the gym including pushups  No numbness or tingling is reported.      Review of patient's allergies indicates:   Allergen Reactions    Sulfa (sulfonamide antibiotics) Hives and Itching         Current Outpatient Medications   Medication Sig Dispense Refill    albuterol 90 mcg/actuation inhaler Inhale 2 puffs into the lungs every 4 (four) hours as needed for Wheezing. Rescue 1 Inhaler 0    traMADol (ULTRAM) 50 mg tablet Take 1 tablet (50 mg total) by mouth every 12 (twelve) hours. (Patient not taking: Reported on 10/21/2019) 60 tablet 2     No current facility-administered medications for this visit.        Past Medical History:   Diagnosis Date    Anxiety     Chronic low back pain     Depression        Past Surgical History:   Procedure Laterality Date    COLONOSCOPY N/A 12/23/2016    Procedure: COLONOSCOPY - Miralax split prep;  Surgeon: Johann Aden MD;  Location[facility];  Service: Endoscopy;  Laterality: N/A;    Tonsillectomy         Review of Systems:  ROS    OBJECTIVE:     PHYSICAL EXAM:  Height: 5' 6" (167.6 cm) Weight: 63 kg (139 lb)  Vitals:    10/21/19 1534   BP: 113/79   Pulse: 72   Temp: 98.6 °F (37 °C)   TempSrc: Oral   Weight: 63 kg (139 lb)   Height: 5' 6" (1.676 m)   PainSc:   2   PainLoc: Wrist     Well developed, well nourished female in no acute distress  Alert and oriented x 3  HEENT- Normal exam  Lungs- Clear to auscultation  Heart- Regular rate and rhythm  Abdomen- Soft nontender  Extremity exam- examination left wrist there is a " small mass dorsally measuring about 1 x 1.5 x 1.5 cm in size on the dorsum of the left wrist  Consistent with a ganglion cyst  Tender to touch  Multilobulated more pronounced with wrist flexion and painful with hyper extension  Sensation intact full range of motion fingers    RADIOGRAPHS:  None  Comments: I have personally reviewed the imaging and I agree with the above radiologist's report.    ASSESSMENT/PLAN:     IMPRESSION:  Ganglion cyst dorsal left wrist symptomatic    PLAN:  I explained the nature of the problem the patient. We discussed options for treatment including injection versus surgery  She would like try injection aspiration today  After pause for time-out identified the left wrist injected the cyst directly with combination Kenalog 20 mg 0.5 cc xylocaine sterile technique  This cause the the cyst to rupture and then massaged in  She tolerated the procedure well without complication  I have given her wrist splint for use in the gym especially for the next 1-2 weeks  Follow-up 3 weeks for recheck       - We talked at length about the anatomy and pathophysiology of   Encounter Diagnoses   Name Primary?    Ganglion cyst     Ganglion cyst of dorsum of left wrist            Disclaimer: This note has been generated using voice-recognition software. There may be typographical errors that have been missed during proof-reading.

## 2019-11-12 ENCOUNTER — PATIENT OUTREACH (OUTPATIENT)
Dept: ADMINISTRATIVE | Facility: OTHER | Age: 44
End: 2019-11-12

## 2019-11-14 ENCOUNTER — OFFICE VISIT (OUTPATIENT)
Dept: ORTHOPEDICS | Facility: CLINIC | Age: 44
End: 2019-11-14
Payer: COMMERCIAL

## 2019-11-14 VITALS — BODY MASS INDEX: 22.34 KG/M2 | HEIGHT: 66 IN | WEIGHT: 139 LBS

## 2019-11-14 DIAGNOSIS — M67.432 GANGLION CYST OF DORSUM OF LEFT WRIST: Primary | ICD-10-CM

## 2019-11-14 PROCEDURE — 99999 PR PBB SHADOW E&M-EST. PATIENT-LVL II: ICD-10-PCS | Mod: PBBFAC,,, | Performed by: ORTHOPAEDIC SURGERY

## 2019-11-14 PROCEDURE — 3008F PR BODY MASS INDEX (BMI) DOCUMENTED: ICD-10-PCS | Mod: CPTII,S$GLB,, | Performed by: ORTHOPAEDIC SURGERY

## 2019-11-14 PROCEDURE — 99213 PR OFFICE/OUTPT VISIT, EST, LEVL III, 20-29 MIN: ICD-10-PCS | Mod: 25,S$GLB,, | Performed by: ORTHOPAEDIC SURGERY

## 2019-11-14 PROCEDURE — 20612 ASPIRATE/INJ GANGLION CYST: CPT | Mod: LT,S$GLB,, | Performed by: ORTHOPAEDIC SURGERY

## 2019-11-14 PROCEDURE — 20612 PR ASPIRAT/INJECTION GANGLION CYST(S): ICD-10-PCS | Mod: LT,S$GLB,, | Performed by: ORTHOPAEDIC SURGERY

## 2019-11-14 PROCEDURE — 99999 PR PBB SHADOW E&M-EST. PATIENT-LVL II: CPT | Mod: PBBFAC,,, | Performed by: ORTHOPAEDIC SURGERY

## 2019-11-14 PROCEDURE — 3008F BODY MASS INDEX DOCD: CPT | Mod: CPTII,S$GLB,, | Performed by: ORTHOPAEDIC SURGERY

## 2019-11-14 PROCEDURE — 99213 OFFICE O/P EST LOW 20 MIN: CPT | Mod: 25,S$GLB,, | Performed by: ORTHOPAEDIC SURGERY

## 2019-11-14 RX ORDER — TRIAMCINOLONE ACETONIDE 40 MG/ML
20 INJECTION, SUSPENSION INTRA-ARTICULAR; INTRAMUSCULAR
Status: COMPLETED | OUTPATIENT
Start: 2019-11-14 | End: 2019-11-14

## 2019-11-14 RX ADMIN — TRIAMCINOLONE ACETONIDE 20 MG: 40 INJECTION, SUSPENSION INTRA-ARTICULAR; INTRAMUSCULAR at 03:11

## 2019-11-14 NOTE — PROGRESS NOTES
"Subjective:      Patient ID: Kimber Moran is a 43 y.o. female.  Chief Complaint: Wrist Pain (left ) and Follow-up      HPI  Kimber Moran is a  43 y.o. female presenting today for follow up of ganglion cyst left wrist.  She reports that she is the cyst got smaller after the previous injection but then recurred she still having pain in the show a wrist we had discussed surgery as an option but she really wants to hold off on that mainly because of her insurance having a large deductible  She would like try another injection today.    Review of patient's allergies indicates:   Allergen Reactions    Sulfa (sulfonamide antibiotics) Hives and Itching         Current Outpatient Medications   Medication Sig Dispense Refill    traMADol (ULTRAM) 50 mg tablet Take 1 tablet (50 mg total) by mouth every 12 (twelve) hours. 60 tablet 2    albuterol 90 mcg/actuation inhaler Inhale 2 puffs into the lungs every 4 (four) hours as needed for Wheezing. Rescue 1 Inhaler 0     No current facility-administered medications for this visit.        Past Medical History:   Diagnosis Date    Anxiety     Chronic low back pain     Depression        Past Surgical History:   Procedure Laterality Date    COLONOSCOPY N/A 12/23/2016    Procedure: COLONOSCOPY - Miralax split prep;  Surgeon: Johann Aden MD;  Location: [facility];  Service: Endoscopy;  Laterality: N/A;    Tonsillectomy         OBJECTIVE:   PHYSICAL EXAM:  Height: 5' 6" (167.6 cm) Weight: 63 kg (139 lb)  Vitals:    11/14/19 1509   Weight: 63 kg (139 lb)   Height: 5' 6" (1.676 m)   PainSc: 0-No pain     Ortho/SPM Exam  Examination left wrist there is a small cyst dorsally more prominent with wrist flexion tender to touch measuring about 1 x 1 cm in size    RADIOGRAPHS:  None  Comments: I have personally reviewed the imaging and I agree with the above radiologist's report.    ASSESSMENT/PLAN:     IMPRESSION:  Ganglion cyst dorsal left wrist symptomatic    PLAN:  Pause for time-out " identified the left wrist injected with 1st xylocaine into the cyst and then following this rupturing the cyst with needle Ursula top shoe and then injecting Kenalog 20 mg 0.5 cc xylocaine sterile technique she tolerated the procedure well without complication  Recommended ice and a wrist splint for 1-2 weeks    FOLLOW UP:  3-4 weeks    Disclaimer: This note has been generated using voice-recognition software. There may be typographical errors that have been missed during proof-reading.

## 2020-01-05 DIAGNOSIS — M51.36 ANNULAR TEAR OF LUMBAR DISC: ICD-10-CM

## 2020-01-05 DIAGNOSIS — M54.42 CHRONIC MIDLINE LOW BACK PAIN WITH LEFT-SIDED SCIATICA: ICD-10-CM

## 2020-01-05 DIAGNOSIS — G89.29 CHRONIC MIDLINE LOW BACK PAIN WITH LEFT-SIDED SCIATICA: ICD-10-CM

## 2020-01-05 DIAGNOSIS — M53.3 SI (SACROILIAC) JOINT DYSFUNCTION: ICD-10-CM

## 2020-01-05 DIAGNOSIS — M51.9 LUMBAR DISC DISEASE: ICD-10-CM

## 2020-01-06 DIAGNOSIS — G89.29 CHRONIC MIDLINE LOW BACK PAIN WITH LEFT-SIDED SCIATICA: ICD-10-CM

## 2020-01-06 DIAGNOSIS — M51.36 ANNULAR TEAR OF LUMBAR DISC: ICD-10-CM

## 2020-01-06 DIAGNOSIS — M51.9 LUMBAR DISC DISEASE: ICD-10-CM

## 2020-01-06 DIAGNOSIS — M53.3 SI (SACROILIAC) JOINT DYSFUNCTION: ICD-10-CM

## 2020-01-06 DIAGNOSIS — M54.42 CHRONIC MIDLINE LOW BACK PAIN WITH LEFT-SIDED SCIATICA: ICD-10-CM

## 2020-01-07 RX ORDER — TRAMADOL HYDROCHLORIDE 50 MG/1
TABLET ORAL
Qty: 60 TABLET | OUTPATIENT
Start: 2020-01-07

## 2020-01-07 RX ORDER — TRAMADOL HYDROCHLORIDE 50 MG/1
TABLET ORAL
Qty: 60 TABLET | Refills: 2 | Status: SHIPPED | OUTPATIENT
Start: 2020-01-07 | End: 2020-04-23

## 2020-01-31 ENCOUNTER — TELEPHONE (OUTPATIENT)
Dept: FAMILY MEDICINE | Facility: CLINIC | Age: 45
End: 2020-01-31

## 2020-01-31 NOTE — TELEPHONE ENCOUNTER
Spoke to pt and states that her Annual labs was coded incorrectly. Pt stated that her insurance stated that it was coded under F41.9 (Anxiety). Pt stated that her has to be coded under her Annual. Pt would like labs to be resubmitted. Pls advise.

## 2020-01-31 NOTE — TELEPHONE ENCOUNTER
It was submitted for annual and other codes - it was not just for anxiety - how we can re-submit for the labs done in past ??

## 2020-01-31 NOTE — TELEPHONE ENCOUNTER
----- Message from Vianey Magdaleno sent at 1/31/2020  8:32 AM CST -----  Contact: [phone]/self   Patient is requesting to speak with nurse regarding a wrong code on her bill. Pt states she spoke with billing dept and her insurance company already was told 's office needed to change it. Please advise.

## 2020-01-31 NOTE — TELEPHONE ENCOUNTER
Spoke to Dr. Perez and resubmitted pt's labs under Wellness code. Pt was informed that her labs was resubmitted.

## 2020-02-05 ENCOUNTER — OFFICE VISIT (OUTPATIENT)
Dept: FAMILY MEDICINE | Facility: CLINIC | Age: 45
End: 2020-02-05
Attending: FAMILY MEDICINE
Payer: COMMERCIAL

## 2020-02-05 VITALS
HEIGHT: 66 IN | WEIGHT: 140.44 LBS | DIASTOLIC BLOOD PRESSURE: 70 MMHG | OXYGEN SATURATION: 97 % | HEART RATE: 68 BPM | BODY MASS INDEX: 22.57 KG/M2 | TEMPERATURE: 99 F | SYSTOLIC BLOOD PRESSURE: 112 MMHG

## 2020-02-05 DIAGNOSIS — M51.9 LUMBAR DISC DISEASE: Primary | ICD-10-CM

## 2020-02-05 DIAGNOSIS — J98.01 BRONCHOSPASM: ICD-10-CM

## 2020-02-05 DIAGNOSIS — M53.3 SI (SACROILIAC) JOINT DYSFUNCTION: ICD-10-CM

## 2020-02-05 DIAGNOSIS — M51.36 ANNULAR TEAR OF LUMBAR DISC: ICD-10-CM

## 2020-02-05 DIAGNOSIS — M25.552 PAIN OF LEFT HIP JOINT: ICD-10-CM

## 2020-02-05 DIAGNOSIS — F41.9 ANXIETY: ICD-10-CM

## 2020-02-05 PROCEDURE — 99214 OFFICE O/P EST MOD 30 MIN: CPT | Mod: S$GLB,,, | Performed by: FAMILY MEDICINE

## 2020-02-05 PROCEDURE — 3008F PR BODY MASS INDEX (BMI) DOCUMENTED: ICD-10-PCS | Mod: CPTII,S$GLB,, | Performed by: FAMILY MEDICINE

## 2020-02-05 PROCEDURE — 99214 PR OFFICE/OUTPT VISIT, EST, LEVL IV, 30-39 MIN: ICD-10-PCS | Mod: S$GLB,,, | Performed by: FAMILY MEDICINE

## 2020-02-05 PROCEDURE — 99999 PR PBB SHADOW E&M-EST. PATIENT-LVL V: CPT | Mod: PBBFAC,,, | Performed by: FAMILY MEDICINE

## 2020-02-05 PROCEDURE — 3008F BODY MASS INDEX DOCD: CPT | Mod: CPTII,S$GLB,, | Performed by: FAMILY MEDICINE

## 2020-02-05 PROCEDURE — 99999 PR PBB SHADOW E&M-EST. PATIENT-LVL V: ICD-10-PCS | Mod: PBBFAC,,, | Performed by: FAMILY MEDICINE

## 2020-02-05 RX ORDER — ALBUTEROL SULFATE 90 UG/1
2 AEROSOL, METERED RESPIRATORY (INHALATION) EVERY 4 HOURS PRN
Qty: 18 G | Refills: 1 | Status: SHIPPED | OUTPATIENT
Start: 2020-02-05 | End: 2020-03-06

## 2020-02-05 RX ORDER — VALACYCLOVIR HYDROCHLORIDE 500 MG/1
TABLET, FILM COATED ORAL
COMMUNITY
Start: 2019-12-26

## 2020-02-05 NOTE — PROGRESS NOTES
Subjective:       Patient ID: Kimber Moran is a 44 y.o. female.    Chief Complaint: Follow-up    43 yr old pleasant white female with chronic low back pain, lumbar disc disease, presents today for her annual wellness check, lab work and 3 month follow up and medication refills. C/o persisting low back and left hip pain and would like intervention done. SHE IS ALSO C/O SOB DURING EXERCISE.     Chronic LBP - onset many years ago - tried spine epidural injections and seen chiropractor and nothing helped her - she started taking tramadoland it helped - she also tried norco for severe pain - she was never offered physical therapy - she denies any surgery in the back and no car accidents - her MRI from 2013 showed mild degenerative change with no significant central canal or neural foraminal stenosis. L4-5 annular fissure. Of note, no neurological symptoms. She also requesting PT.        History as below - reviewed    HM  -labs UTD      Cyst   This is a chronic problem. The current episode started more than 1 month ago. The problem occurs constantly. The problem has been gradually worsening. Associated symptoms include joint swelling and myalgias. Pertinent negatives include no abdominal pain, arthralgias, chest pain, congestion, diaphoresis, fatigue, headaches, nausea, neck pain, rash, sore throat, swollen glands, vertigo or vomiting. The symptoms are aggravated by bending. She has tried nothing for the symptoms. The treatment provided no relief.   Medication Refill   This is a chronic problem. The current episode started more than 1 year ago. The problem occurs constantly. The problem has been gradually improving. Associated symptoms include joint swelling and myalgias. Pertinent negatives include no abdominal pain, arthralgias, chest pain, congestion, diaphoresis, fatigue, headaches, nausea, neck pain, rash, sore throat, swollen glands, vertigo or vomiting. Nothing aggravates the symptoms. Treatments tried: ultracet.  The treatment provided significant relief.   Back Pain   This is a chronic problem. The current episode started more than 1 year ago. The problem occurs constantly. The problem is unchanged. The pain is present in the lumbar spine. The quality of the pain is described as aching. The pain does not radiate. The pain is at a severity of 6/10. The pain is moderate. The symptoms are aggravated by bending, sitting and twisting. Pertinent negatives include no abdominal pain, chest pain, dysuria, headaches, paresthesias, pelvic pain or tingling. She has tried analgesics for the symptoms. The treatment provided significant relief.     Review of Systems   Constitutional: Negative.  Negative for activity change, diaphoresis, fatigue and unexpected weight change.   HENT: Negative.  Negative for congestion, ear discharge, hearing loss, rhinorrhea, sore throat and voice change.    Eyes: Negative.  Negative for pain, discharge and visual disturbance.   Respiratory: Negative.  Negative for chest tightness, shortness of breath and wheezing.    Cardiovascular: Negative.  Negative for chest pain.   Gastrointestinal: Negative.  Negative for abdominal distention, abdominal pain, anal bleeding, constipation, nausea and vomiting.   Endocrine: Negative.  Negative for cold intolerance, polydipsia and polyuria.   Genitourinary: Negative.  Negative for decreased urine volume, difficulty urinating, dysuria, frequency, menstrual problem, pelvic pain and vaginal pain.   Musculoskeletal: Positive for back pain, joint swelling and myalgias. Negative for arthralgias, gait problem and neck pain.   Skin: Negative.  Negative for color change, pallor, rash and wound.   Allergic/Immunologic: Negative.  Negative for environmental allergies and immunocompromised state.   Neurological: Negative.  Negative for dizziness, vertigo, tingling, tremors, seizures, speech difficulty, headaches and paresthesias.   Hematological: Negative.  Negative for adenopathy.  Does not bruise/bleed easily.   Psychiatric/Behavioral: Negative.  Negative for agitation, confusion, decreased concentration, hallucinations, self-injury and suicidal ideas. The patient is not nervous/anxious.        PMH/PSH/FH/SH/MED/ALLERGY reviewed      Objective:       Vitals:    02/05/20 0812   BP: 112/70   Pulse: 68   Temp: 99.2 °F (37.3 °C)       Physical Exam   Constitutional: She is oriented to person, place, and time. She appears well-developed and well-nourished. No distress.   HENT:   Head: Normocephalic and atraumatic.   Right Ear: External ear normal.   Left Ear: External ear normal.   Nose: Nose normal.   Mouth/Throat: Oropharynx is clear and moist. No oropharyngeal exudate.   Eyes: Pupils are equal, round, and reactive to light. Conjunctivae and EOM are normal. Right eye exhibits no discharge. Left eye exhibits no discharge. No scleral icterus.   Neck: Normal range of motion. Neck supple. No JVD present. No tracheal deviation present. No thyromegaly present.   Cardiovascular: Normal rate, regular rhythm, normal heart sounds and intact distal pulses. Exam reveals no gallop and no friction rub.   No murmur heard.  Pulmonary/Chest: Effort normal and breath sounds normal. No stridor. No respiratory distress. She has no wheezes. She has no rales. She exhibits no tenderness.   Abdominal: Soft. Bowel sounds are normal. She exhibits no distension and no mass. There is no tenderness. There is no rebound and no guarding. No hernia.   Musculoskeletal: Normal range of motion. She exhibits tenderness (TTP lumbar and paralumbar area. SLRT neg B/L. Gait normal.). She exhibits no edema.   TTP left hip and mild abnormal Rom   Lymphadenopathy:     She has no cervical adenopathy.   Neurological: She is alert and oriented to person, place, and time. She has normal reflexes. She displays normal reflexes. No cranial nerve deficit. She exhibits normal muscle tone. Coordination normal.   Skin: Skin is warm and dry. No rash  noted. She is not diaphoretic. No erythema. No pallor.   Psychiatric: She has a normal mood and affect. Her behavior is normal. Judgment and thought content normal.       Assessment:       1. Lumbar disc disease    2. Annular tear of lumbar disc    3. Anxiety    4. SI (sacroiliac) joint dysfunction    5. Pain of left hip joint    6. Bronchospasm        Plan:           Kimber was seen today for follow-up.    Diagnoses and all orders for this visit:    Lumbar disc disease  -     X-Ray Lumbar Spine AP And Lateral; Future  -     MRI Lumbar Spine Without Contrast; Future  -     Ambulatory referral/consult to Pain Clinic; Future  -     Ambulatory referral/consult to Neurosurgery; Future    Annular tear of lumbar disc  -     X-Ray Lumbar Spine AP And Lateral; Future  -     MRI Lumbar Spine Without Contrast; Future  -     Ambulatory referral/consult to Pain Clinic; Future  -     Ambulatory referral/consult to Neurosurgery; Future    Anxiety    SI (sacroiliac) joint dysfunction  -     X-Ray Sacroiliac Joints Complete; Future  -     Ambulatory referral/consult to Pain Clinic; Future  -     Ambulatory referral/consult to Neurosurgery; Future    Pain of left hip joint  -     X-Ray Hip 2 View Left; Future  -     Ambulatory referral/consult to Pain Clinic; Future  -     Ambulatory referral/consult to Neurosurgery; Future    Bronchospasm  -     albuterol (PROVENTIL/VENTOLIN HFA) 90 mcg/actuation inhaler; Inhale 2 puffs into the lungs every 4 (four) hours as needed for Wheezing. Rescue       BRONCHOSPASM OR EIA  -PROAIR PRN BEFORE EXERCISE PRN      Chronic low back pain  -advised physical therapy  -home exercises  -limit use of tramadol - due to addictive potential  -X RAYS AND MRI - REFER PAIN CLINIC AND NEURO SURGERY        Spent adequate time in obtaining history and explaining differentials    25 minutes spent during this visit of which greater than 50% devoted to face-face counseling and coordination of care regarding  diagnosis and management plan    Follow up in about 3 months (around 5/5/2020), or if symptoms worsen or fail to improve.

## 2020-02-06 ENCOUNTER — TELEPHONE (OUTPATIENT)
Dept: FAMILY MEDICINE | Facility: CLINIC | Age: 45
End: 2020-02-06

## 2020-02-07 ENCOUNTER — TELEPHONE (OUTPATIENT)
Dept: FAMILY MEDICINE | Facility: CLINIC | Age: 45
End: 2020-02-07

## 2020-02-10 ENCOUNTER — TELEPHONE (OUTPATIENT)
Dept: FAMILY MEDICINE | Facility: CLINIC | Age: 45
End: 2020-02-10

## 2020-04-23 DIAGNOSIS — M53.3 SI (SACROILIAC) JOINT DYSFUNCTION: ICD-10-CM

## 2020-04-23 DIAGNOSIS — G89.29 CHRONIC MIDLINE LOW BACK PAIN WITH LEFT-SIDED SCIATICA: ICD-10-CM

## 2020-04-23 DIAGNOSIS — M51.9 LUMBAR DISC DISEASE: ICD-10-CM

## 2020-04-23 DIAGNOSIS — M51.36 ANNULAR TEAR OF LUMBAR DISC: ICD-10-CM

## 2020-04-23 DIAGNOSIS — M54.42 CHRONIC MIDLINE LOW BACK PAIN WITH LEFT-SIDED SCIATICA: ICD-10-CM

## 2020-04-23 RX ORDER — TRAMADOL HYDROCHLORIDE 50 MG/1
TABLET ORAL
Qty: 60 TABLET | Refills: 2 | Status: SHIPPED | OUTPATIENT
Start: 2020-04-23 | End: 2020-07-28

## 2020-10-02 DIAGNOSIS — Z12.31 OTHER SCREENING MAMMOGRAM: ICD-10-CM

## 2021-04-12 ENCOUNTER — PATIENT MESSAGE (OUTPATIENT)
Dept: ADMINISTRATIVE | Facility: HOSPITAL | Age: 46
End: 2021-04-12

## 2021-04-26 ENCOUNTER — PATIENT OUTREACH (OUTPATIENT)
Dept: ADMINISTRATIVE | Facility: HOSPITAL | Age: 46
End: 2021-04-26

## 2021-04-29 ENCOUNTER — PATIENT OUTREACH (OUTPATIENT)
Dept: ADMINISTRATIVE | Facility: HOSPITAL | Age: 46
End: 2021-04-29

## 2021-07-06 ENCOUNTER — PATIENT MESSAGE (OUTPATIENT)
Dept: ADMINISTRATIVE | Facility: HOSPITAL | Age: 46
End: 2021-07-06

## 2021-10-04 ENCOUNTER — PATIENT MESSAGE (OUTPATIENT)
Dept: ADMINISTRATIVE | Facility: HOSPITAL | Age: 46
End: 2021-10-04

## 2021-12-08 DIAGNOSIS — Z12.31 OTHER SCREENING MAMMOGRAM: ICD-10-CM

## 2022-01-10 ENCOUNTER — PATIENT MESSAGE (OUTPATIENT)
Dept: ADMINISTRATIVE | Facility: HOSPITAL | Age: 47
End: 2022-01-10
Payer: COMMERCIAL

## 2022-05-31 ENCOUNTER — PATIENT MESSAGE (OUTPATIENT)
Dept: ADMINISTRATIVE | Facility: HOSPITAL | Age: 47
End: 2022-05-31
Payer: COMMERCIAL